# Patient Record
Sex: FEMALE | Race: WHITE | HISPANIC OR LATINO | Employment: STUDENT | URBAN - METROPOLITAN AREA
[De-identification: names, ages, dates, MRNs, and addresses within clinical notes are randomized per-mention and may not be internally consistent; named-entity substitution may affect disease eponyms.]

---

## 2017-10-13 ENCOUNTER — HOSPITAL ENCOUNTER (INPATIENT)
Facility: HOSPITAL | Age: 27
LOS: 24 days | Discharge: HOME | DRG: 750 | End: 2017-11-06
Attending: PSYCHIATRY & NEUROLOGY | Admitting: PSYCHIATRY & NEUROLOGY
Payer: COMMERCIAL

## 2017-10-13 ENCOUNTER — HOSPITAL ENCOUNTER (EMERGENCY)
Facility: HOSPITAL | Age: 27
End: 2017-10-13
Attending: EMERGENCY MEDICINE | Admitting: EMERGENCY MEDICINE
Payer: COMMERCIAL

## 2017-10-13 VITALS
TEMPERATURE: 98 F | OXYGEN SATURATION: 100 % | WEIGHT: 144 LBS | RESPIRATION RATE: 16 BRPM | HEART RATE: 84 BPM | DIASTOLIC BLOOD PRESSURE: 76 MMHG | SYSTOLIC BLOOD PRESSURE: 111 MMHG

## 2017-10-13 DIAGNOSIS — E03.9 HYPOTHYROID: ICD-10-CM

## 2017-10-13 DIAGNOSIS — F20.0 PARANOID SCHIZOPHRENIA (HCC): ICD-10-CM

## 2017-10-13 DIAGNOSIS — F25.1 SCHIZOAFFECTIVE DISORDER, DEPRESSIVE TYPE (HCC): Chronic | ICD-10-CM

## 2017-10-13 DIAGNOSIS — E63.9 NUTRITIONAL DEFICIENCY: ICD-10-CM

## 2017-10-13 DIAGNOSIS — F25.0 SCHIZOAFFECTIVE DISORDER, BIPOLAR TYPE (HCC): Primary | Chronic | ICD-10-CM

## 2017-10-13 DIAGNOSIS — E05.90 HYPERTHYROIDISM: ICD-10-CM

## 2017-10-13 DIAGNOSIS — F22 PARANOIA (HCC): Primary | ICD-10-CM

## 2017-10-13 LAB
AMPHETAMINES SERPL QL SCN: NEGATIVE
BARBITURATES UR QL: NEGATIVE
BENZODIAZ UR QL: NEGATIVE
COCAINE UR QL: NEGATIVE
ETHANOL EXG-MCNC: 0 MG/DL
EXT PREG TEST URINE: NEGATIVE
METHADONE UR QL: NEGATIVE
OPIATES UR QL SCN: NEGATIVE
PCP UR QL: NEGATIVE
THC UR QL: NEGATIVE

## 2017-10-13 PROCEDURE — 81025 URINE PREGNANCY TEST: CPT | Performed by: EMERGENCY MEDICINE

## 2017-10-13 PROCEDURE — 99285 EMERGENCY DEPT VISIT HI MDM: CPT

## 2017-10-13 PROCEDURE — 80307 DRUG TEST PRSMV CHEM ANLYZR: CPT | Performed by: EMERGENCY MEDICINE

## 2017-10-13 PROCEDURE — 82075 ASSAY OF BREATH ETHANOL: CPT | Performed by: EMERGENCY MEDICINE

## 2017-10-13 RX ORDER — LEVOTHYROXINE SODIUM 0.1 MG/1
100 TABLET ORAL
Status: DISCONTINUED | OUTPATIENT
Start: 2017-10-14 | End: 2017-10-27

## 2017-10-13 RX ORDER — MAGNESIUM HYDROXIDE/ALUMINUM HYDROXICE/SIMETHICONE 120; 1200; 1200 MG/30ML; MG/30ML; MG/30ML
30 SUSPENSION ORAL EVERY 4 HOURS PRN
Status: CANCELLED | OUTPATIENT
Start: 2017-10-13

## 2017-10-13 RX ORDER — BENZTROPINE MESYLATE 1 MG/ML
1 INJECTION INTRAMUSCULAR; INTRAVENOUS EVERY 6 HOURS PRN
Status: CANCELLED | OUTPATIENT
Start: 2017-10-13

## 2017-10-13 RX ORDER — ACETAMINOPHEN 325 MG/1
650 TABLET ORAL EVERY 6 HOURS PRN
Status: DISCONTINUED | OUTPATIENT
Start: 2017-10-13 | End: 2017-11-06 | Stop reason: HOSPADM

## 2017-10-13 RX ORDER — LEVOTHYROXINE SODIUM 0.1 MG/1
100 TABLET ORAL DAILY
Status: CANCELLED | OUTPATIENT
Start: 2017-10-14

## 2017-10-13 RX ORDER — HALOPERIDOL 5 MG
5 TABLET ORAL EVERY 6 HOURS PRN
Status: CANCELLED | OUTPATIENT
Start: 2017-10-13

## 2017-10-13 RX ORDER — ACETAMINOPHEN 325 MG/1
650 TABLET ORAL EVERY 6 HOURS PRN
Status: CANCELLED | OUTPATIENT
Start: 2017-10-13

## 2017-10-13 RX ORDER — IBUPROFEN 400 MG/1
800 TABLET ORAL EVERY 8 HOURS PRN
Status: DISCONTINUED | OUTPATIENT
Start: 2017-10-13 | End: 2017-11-06 | Stop reason: HOSPADM

## 2017-10-13 RX ORDER — LEVOTHYROXINE SODIUM 0.1 MG/1
100 TABLET ORAL ONCE
Status: COMPLETED | OUTPATIENT
Start: 2017-10-13 | End: 2017-10-13

## 2017-10-13 RX ORDER — OLANZAPINE 10 MG/1
10 INJECTION, POWDER, LYOPHILIZED, FOR SOLUTION INTRAMUSCULAR
Status: DISCONTINUED | OUTPATIENT
Start: 2017-10-13 | End: 2017-11-06 | Stop reason: HOSPADM

## 2017-10-13 RX ORDER — HALOPERIDOL 5 MG/ML
5 INJECTION INTRAMUSCULAR EVERY 6 HOURS PRN
Status: DISCONTINUED | OUTPATIENT
Start: 2017-10-13 | End: 2017-11-06 | Stop reason: HOSPADM

## 2017-10-13 RX ORDER — IBUPROFEN 400 MG/1
800 TABLET ORAL EVERY 8 HOURS PRN
Status: CANCELLED | OUTPATIENT
Start: 2017-10-13

## 2017-10-13 RX ORDER — HYDROXYZINE HYDROCHLORIDE 25 MG/1
25 TABLET, FILM COATED ORAL EVERY 6 HOURS PRN
Status: CANCELLED | OUTPATIENT
Start: 2017-10-13

## 2017-10-13 RX ORDER — TRAZODONE HYDROCHLORIDE 50 MG/1
50 TABLET ORAL
Status: DISCONTINUED | OUTPATIENT
Start: 2017-10-13 | End: 2017-11-06 | Stop reason: HOSPADM

## 2017-10-13 RX ORDER — BENZTROPINE MESYLATE 1 MG/ML
1 INJECTION INTRAMUSCULAR; INTRAVENOUS EVERY 6 HOURS PRN
Status: DISCONTINUED | OUTPATIENT
Start: 2017-10-13 | End: 2017-11-06 | Stop reason: HOSPADM

## 2017-10-13 RX ORDER — LORAZEPAM 2 MG/ML
1 INJECTION INTRAMUSCULAR EVERY 4 HOURS PRN
Status: CANCELLED | OUTPATIENT
Start: 2017-10-13

## 2017-10-13 RX ORDER — LORAZEPAM 2 MG/ML
1 INJECTION INTRAMUSCULAR EVERY 4 HOURS PRN
Status: DISCONTINUED | OUTPATIENT
Start: 2017-10-13 | End: 2017-11-06 | Stop reason: HOSPADM

## 2017-10-13 RX ORDER — HALOPERIDOL 5 MG/ML
5 INJECTION INTRAMUSCULAR EVERY 6 HOURS PRN
Status: CANCELLED | OUTPATIENT
Start: 2017-10-13

## 2017-10-13 RX ORDER — HYDROXYZINE HYDROCHLORIDE 25 MG/1
25 TABLET, FILM COATED ORAL EVERY 6 HOURS PRN
Status: DISCONTINUED | OUTPATIENT
Start: 2017-10-13 | End: 2017-11-06 | Stop reason: HOSPADM

## 2017-10-13 RX ORDER — RISPERIDONE 1 MG/1
1 TABLET, ORALLY DISINTEGRATING ORAL
Status: CANCELLED | OUTPATIENT
Start: 2017-10-13

## 2017-10-13 RX ORDER — LORAZEPAM 1 MG/1
1 TABLET ORAL EVERY 6 HOURS PRN
Status: CANCELLED | OUTPATIENT
Start: 2017-10-13

## 2017-10-13 RX ORDER — TRAZODONE HYDROCHLORIDE 50 MG/1
50 TABLET ORAL
Status: CANCELLED | OUTPATIENT
Start: 2017-10-13

## 2017-10-13 RX ORDER — LORAZEPAM 1 MG/1
1 TABLET ORAL EVERY 6 HOURS PRN
Status: DISCONTINUED | OUTPATIENT
Start: 2017-10-13 | End: 2017-11-06 | Stop reason: HOSPADM

## 2017-10-13 RX ORDER — OLANZAPINE 10 MG/1
10 INJECTION, POWDER, LYOPHILIZED, FOR SOLUTION INTRAMUSCULAR
Status: CANCELLED | OUTPATIENT
Start: 2017-10-13

## 2017-10-13 RX ORDER — ACETAMINOPHEN 325 MG/1
650 TABLET ORAL EVERY 4 HOURS PRN
Status: DISCONTINUED | OUTPATIENT
Start: 2017-10-13 | End: 2017-11-06 | Stop reason: HOSPADM

## 2017-10-13 RX ORDER — ACETAMINOPHEN 325 MG/1
650 TABLET ORAL EVERY 4 HOURS PRN
Status: CANCELLED | OUTPATIENT
Start: 2017-10-13

## 2017-10-13 RX ORDER — RISPERIDONE 1 MG/1
1 TABLET, ORALLY DISINTEGRATING ORAL
Status: DISCONTINUED | OUTPATIENT
Start: 2017-10-13 | End: 2017-11-06 | Stop reason: HOSPADM

## 2017-10-13 RX ORDER — MAGNESIUM HYDROXIDE/ALUMINUM HYDROXICE/SIMETHICONE 120; 1200; 1200 MG/30ML; MG/30ML; MG/30ML
30 SUSPENSION ORAL EVERY 4 HOURS PRN
Status: DISCONTINUED | OUTPATIENT
Start: 2017-10-13 | End: 2017-11-06 | Stop reason: HOSPADM

## 2017-10-13 RX ORDER — HALOPERIDOL 5 MG
5 TABLET ORAL EVERY 6 HOURS PRN
Status: DISCONTINUED | OUTPATIENT
Start: 2017-10-13 | End: 2017-11-06 | Stop reason: HOSPADM

## 2017-10-13 RX ORDER — BENZTROPINE MESYLATE 0.5 MG/1
1 TABLET ORAL EVERY 6 HOURS PRN
Status: CANCELLED | OUTPATIENT
Start: 2017-10-13

## 2017-10-13 RX ORDER — BENZTROPINE MESYLATE 1 MG/1
1 TABLET ORAL EVERY 6 HOURS PRN
Status: DISCONTINUED | OUTPATIENT
Start: 2017-10-13 | End: 2017-11-06 | Stop reason: HOSPADM

## 2017-10-13 RX ADMIN — LEVOTHYROXINE SODIUM 100 MCG: 100 TABLET ORAL at 08:35

## 2017-10-13 NOTE — ED NOTES
[facility] arrived for transport  Given all of patient's belongings and paperwork  Patient remains cooperative at time of transport       Betty Baig RN  10/13/17 0383
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
## 2017-10-13 NOTE — ED NOTES
Report called to Ankit at [facility]  Noted in report that patient's thyroid medication will be in her backpack       Brian Garza RN  10/13/17 Britton Moya RN  10/13/17 7030

## 2017-10-13 NOTE — ED NOTES
Assumed care of pt at this time; pt sleeping in bed; respirations relaxed and unlabored       Andi Ro RN  10/13/17 3608

## 2017-10-13 NOTE — H&P
Chief Complaint: weight gain    HPI: Therese Jansen is a 32 y o  female who presents to ER with family complaining of pt's bizarre behavior and threats to kill her father and paranoia and hallucinations  Pt is primarily concerned with losing weight  Pt admitted for psychiatric evaluation and treatment  Language line was used to interpret, pt mostly [place] Speaking  Review of Systems:  General: negative  Cardiovascular: no chest pain or dyspnea on exertion  Respiratory: no cough, shortness of breath, or wheezing  Gastrointestinal: no abdominal pain, change in bowel habits, or black or bloody stools  Genitourinary ROS: no dysuria, trouble voiding, or hematuria  Musculoskeletal ROS: negative  Neurological ROS: no TIA or stroke symptoms  Hematological and Lymphatic ROS: negative  Dermatological ROS: negative  Psychological ROS: positive for - behavioral disorder, hallucinations, obsessive thoughts and sleep disturbances  Ophthalmic ROS: negative  ENT ROS: positive for - sore throat and throat pain around thyroid area    Past Medical History:  Past Medical History:   Diagnosis Date    Disease of thyroid gland     Psychiatric illness     Schizoaffective disorder ([facility] [address] )     Schizophrenia ([facility] )        Past Surgical History:  Past Surgical History:   Procedure Laterality Date    ADENOIDECTOMY      TONSILLECTOMY         Social History:  History   Alcohol Use    Yes     Comment: Once a year on [employer]     History   Drug Use No     History   Smoking Status    Never Smoker   Smokeless Tobacco    Not on file       Family History:  Family History   Problem Relation Age of Onset    Thyroid disease Mother     Sleep apnea Father        Allergies:   Allergies   Allergen Reactions    Benadryl [Diphenhydramine]        Medications:  current meds:   Current Facility-Administered Medications   Medication Dose Route Frequency    acetaminophen (TYLENOL) tablet 650 mg  650 mg Oral Q6H PRN    acetaminophen (TYLENOL) tablet 650 mg 650 mg Oral Q4H PRN    aluminum-magnesium hydroxide-simethicone (MYLANTA) 200-200-20 mg/5 mL oral suspension 30 mL  30 mL Oral Q4H PRN    benztropine (COGENTIN) injection 1 mg  1 mg Intramuscular Q6H PRN    benztropine (COGENTIN) tablet 1 mg  1 mg Oral Q6H PRN    haloperidol (HALDOL) tablet 5 mg  5 mg Oral Q6H PRN    haloperidol lactate (HALDOL) injection 5 mg  5 mg Intramuscular Q6H PRN    hydrOXYzine HCL (ATARAX) tablet 25 mg  25 mg Oral Q6H PRN    ibuprofen (MOTRIN) tablet 800 mg  800 mg Oral Q8H PRN    [START ON 10/14/2017] levothyroxine tablet 100 mcg  100 mcg Oral Early Morning    LORazepam (ATIVAN) 2 mg/mL injection 1 mg  1 mg Intramuscular Q4H PRN    LORazepam (ATIVAN) tablet 1 mg  1 mg Oral Q6H PRN    magnesium hydroxide (MILK OF MAGNESIA) 400 mg/5 mL oral suspension 30 mL  30 mL Oral Daily PRN    OLANZapine (ZyPREXA) IM injection 10 mg  10 mg Intramuscular Q3H PRN    risperiDONE (RisperDAL M-TABS) dispersible tablet 1 mg  1 mg Oral Q3H PRN    traZODone (DESYREL) tablet 50 mg  50 mg Oral HS PRN       Vitals:  /82   Pulse 99   Temp 98 4 °F (36 9 °C) (Oral)   Resp 16   Ht 5' 3" (1 6 m)   Wt 65 3 kg (144 lb)   BMI 25 51 kg/m²     Lab Results and Cultures:   CBC with diff:   Lab Results   Component Value Date    WBC 10 65 (H) 02/21/2016    HGB 14 3 02/21/2016    HCT 41 9 02/21/2016    MCV 82 02/21/2016     02/21/2016    MCH 28 0 02/21/2016    MCHC 34 1 02/21/2016    RDW 11 7 02/21/2016    MPV 11 0 02/21/2016    NRBC 0 02/21/2016   ,   BMP/CMP:  Lab Results   Component Value Date     02/21/2016     08/10/2015    K 3 8 02/21/2016    K 4 1 08/10/2015     02/21/2016     08/10/2015    CO2 26 02/21/2016    CO2 29 08/10/2015    ANIONGAP 11 02/21/2016    ANIONGAP 5 08/10/2015    BUN 10 02/21/2016    BUN 14 08/10/2015    CREATININE 0 67 02/21/2016    CREATININE 0 67 08/10/2015    GLUCOSE 99 02/21/2016    GLUCOSE 93 08/10/2015    CALCIUM 9 5 02/21/2016    CALCIUM 8 5 08/10/2015    AST 11 01/21/2016    AST 16 08/10/2015    ALT 15 01/21/2016    ALT 30 08/10/2015    ALKPHOS 80 01/21/2016    ALKPHOS 79 08/10/2015    PROT 8 0 01/21/2016    PROT 7 6 08/10/2015    ALBUMIN 3 9 01/21/2016    ALBUMIN 3 8 08/10/2015    BILITOT 0 63 01/21/2016    BILITOT 0 50 08/10/2015    EGFR >60 0 02/21/2016   ,   Lipid Panel:   Lab Results   Component Value Date    CHOL 160 01/21/2016    CHOL 162 08/10/2015   ,   Coags: No results found for: PT, PTT, INR,     Blood Culture: No results found for: BLOODCX,   Urinalysis: No results found for: Sanders Inks, SPECGRAV, PHUR, LEUKOCYTESUR, NITRITE, PROTEINUA, GLUCOSEU, KETONESU, BILIRUBINUR, BLOODU,   Urine Culture: No results found for: URINECX,   Wound Culure: No results found for: WOUNDCULT      Physical Exam:    General appearance: alert, appears stated age and cooperative  Head: Normocephalic, without obvious abnormality, atraumatic  Eyes: conjunctivae/corneas clear  PERRL, EOM's intact  Fundi benign    Throat: lips, mucosa, and tongue normal; teeth and gums normal  Neck: no adenopathy, supple, symmetrical, trachea midline and thyroid tender, to masses felt  Lungs: clear to auscultation bilaterally  Heart: regular rate and rhythm, S1, S2 normal, no murmur, click, rub or gallop  Abdomen: soft, non-tender; bowel sounds normal; no masses,  no organomegaly  Genitalia: not applicable  Rectal: not applicable  Extremities: extremities normal, atraumatic, no cyanosis or edema  Skin: Skin color, texture, turgor normal  No rashes or lesions  Neurologic: Grossly normal, AxOx3, CN2-12 intact    Assessment:  33 yo female with bizarre behavior and known schizophrenia    Plan:  Admit for psychiatric evaluation and treatment  Internal medicine consult for throat pain/thyroid tenderness

## 2017-10-13 NOTE — ED NOTES
Patient ate all of her lunch  Mother and sister at bedside now  Patient will be transported to [facility] in approximately 30 minutes  Family is taking home the clothing from yesterday and patient's wallet  They did bring a backpack with bathroom supplies and clean clothing  Also brought patient's home thyroid medication       Heidy Chaney, RN  10/13/17 [id number], RN  10/13/17 2249

## 2017-10-13 NOTE — ED NOTES
Patient out of shower  Returned towels, toothbrush, toothpaste and soap       Rebecca Smith RN  10/13/17 5049
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
## 2017-10-13 NOTE — ED NOTES
Chart reviewed  Bed search in progress  Voicemail received from [place] requesting referral be faxed to [facility] - [phone]  Referral packet faxed
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
## 2017-10-13 NOTE — ED NOTES
Pt family states they would like to be informed with updates  Pts sister Jenelle Pitch to be called first at [phone]       Althea Keane RN  10/13/17 3433

## 2017-10-13 NOTE — ED NOTES
Patient provided with towel and new scrubs so that she may take a shower       Karson Crawford RN  10/13/17 2045
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
## 2017-10-14 LAB
ALBUMIN SERPL BCP-MCNC: 3.5 G/DL (ref 3.5–5)
ALP SERPL-CCNC: 68 U/L (ref 46–116)
ALT SERPL W P-5'-P-CCNC: 15 U/L (ref 12–78)
ANION GAP SERPL CALCULATED.3IONS-SCNC: 7 MMOL/L (ref 4–13)
AST SERPL W P-5'-P-CCNC: 8 U/L (ref 5–45)
BASOPHILS # BLD AUTO: 0.06 THOUSANDS/ΜL (ref 0–0.1)
BASOPHILS NFR BLD AUTO: 1 % (ref 0–1)
BILIRUB SERPL-MCNC: 0.4 MG/DL (ref 0.2–1)
BUN SERPL-MCNC: 12 MG/DL (ref 5–25)
CALCIUM SERPL-MCNC: 8.5 MG/DL (ref 8.3–10.1)
CHLORIDE SERPL-SCNC: 106 MMOL/L (ref 100–108)
CHOLEST SERPL-MCNC: 152 MG/DL (ref 50–200)
CO2 SERPL-SCNC: 25 MMOL/L (ref 21–32)
CREAT SERPL-MCNC: 0.66 MG/DL (ref 0.6–1.3)
EOSINOPHIL # BLD AUTO: 0.25 THOUSAND/ΜL (ref 0–0.61)
EOSINOPHIL NFR BLD AUTO: 5 % (ref 0–6)
ERYTHROCYTE [DISTWIDTH] IN BLOOD BY AUTOMATED COUNT: 12.1 % (ref 11.6–15.1)
GFR SERPL CREATININE-BSD FRML MDRD: 121 ML/MIN/1.73SQ M
GLUCOSE P FAST SERPL-MCNC: 89 MG/DL (ref 65–99)
GLUCOSE SERPL-MCNC: 89 MG/DL (ref 65–140)
HCG SERPL QL: NEGATIVE
HCT VFR BLD AUTO: 39.9 % (ref 34.8–46.1)
HDLC SERPL-MCNC: 47 MG/DL (ref 40–60)
HGB BLD-MCNC: 13.4 G/DL (ref 11.5–15.4)
LDLC SERPL CALC-MCNC: 93 MG/DL (ref 0–100)
LYMPHOCYTES # BLD AUTO: 1.74 THOUSANDS/ΜL (ref 0.6–4.47)
LYMPHOCYTES NFR BLD AUTO: 36 % (ref 14–44)
MCH RBC QN AUTO: 27.9 PG (ref 26.8–34.3)
MCHC RBC AUTO-ENTMCNC: 33.6 G/DL (ref 31.4–37.4)
MCV RBC AUTO: 83 FL (ref 82–98)
MONOCYTES # BLD AUTO: 0.41 THOUSAND/ΜL (ref 0.17–1.22)
MONOCYTES NFR BLD AUTO: 8 % (ref 4–12)
NEUTROPHILS # BLD AUTO: 2.42 THOUSANDS/ΜL (ref 1.85–7.62)
NEUTS SEG NFR BLD AUTO: 50 % (ref 43–75)
NRBC BLD AUTO-RTO: 0 /100 WBCS
PLATELET # BLD AUTO: 241 THOUSANDS/UL (ref 149–390)
PMV BLD AUTO: 11 FL (ref 8.9–12.7)
POTASSIUM SERPL-SCNC: 4.3 MMOL/L (ref 3.5–5.3)
PROT SERPL-MCNC: 7.5 G/DL (ref 6.4–8.2)
RBC # BLD AUTO: 4.8 MILLION/UL (ref 3.81–5.12)
SODIUM SERPL-SCNC: 138 MMOL/L (ref 136–145)
TRIGL SERPL-MCNC: 61 MG/DL
TSH SERPL DL<=0.05 MIU/L-ACNC: 2.78 UIU/ML (ref 0.36–3.74)
WBC # BLD AUTO: 4.89 THOUSAND/UL (ref 4.31–10.16)

## 2017-10-14 PROCEDURE — 80061 LIPID PANEL: CPT | Performed by: PSYCHIATRY & NEUROLOGY

## 2017-10-14 PROCEDURE — 86592 SYPHILIS TEST NON-TREP QUAL: CPT | Performed by: PSYCHIATRY & NEUROLOGY

## 2017-10-14 PROCEDURE — 84443 ASSAY THYROID STIM HORMONE: CPT | Performed by: PSYCHIATRY & NEUROLOGY

## 2017-10-14 PROCEDURE — 85025 COMPLETE CBC W/AUTO DIFF WBC: CPT | Performed by: PSYCHIATRY & NEUROLOGY

## 2017-10-14 PROCEDURE — 80053 COMPREHEN METABOLIC PANEL: CPT | Performed by: PSYCHIATRY & NEUROLOGY

## 2017-10-14 PROCEDURE — 84703 CHORIONIC GONADOTROPIN ASSAY: CPT | Performed by: PSYCHIATRY & NEUROLOGY

## 2017-10-14 RX ORDER — RISPERIDONE 1 MG/1
1 TABLET, FILM COATED ORAL
Status: DISCONTINUED | OUTPATIENT
Start: 2017-10-14 | End: 2017-10-16

## 2017-10-14 RX ADMIN — LEVOTHYROXINE SODIUM 100 MCG: 100 TABLET ORAL at 06:06

## 2017-10-14 RX ADMIN — HYDROXYZINE HYDROCHLORIDE 25 MG: 25 TABLET, FILM COATED ORAL at 18:51
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
## 2017-10-14 NOTE — PLAN OF CARE
Alteration in Thoughts and Perception     Treatment Goal: Gain control of psychotic behaviors/thinking, reduce/eliminate presenting symptoms and demonstrate improved reality functioning upon discharge Not Progressing        Patient has paranoid ideation possible delusions patient is reporting AH    Alteration in Thoughts and Perception     Verbalize thoughts and feelings Progressing     Refrain from acting on delusional thinking/internal stimuli Progressing     Agree to be compliant with medication regime, as prescribed and report medication side effects Progressing     Recognize dysfunctional thoughts, communicate reality-based thoughts at the time of discharge Progressing     Complete daily ADLs, including personal hygiene independently, as able [address] Discharge to home or other facility with appropriate resources Progressing        Ineffective Coping     Cooperates with admission process Progressing     Identifies ineffective coping skills Progressing     Identifies healthy coping skills Progressing     Participates in unit activities Progressing     Participates in unit activities Progressing     Patient/Family participate in treatment and DC plans Progressing        Risk for Violence/Aggression Toward Others     Treatment Goal: Refrain from acts of violence/aggression during length of stay, and demonstrate improved impulse control at the time of discharge Progressing     Refrain from destructive acts on the environment or property Progressing
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
## 2017-10-14 NOTE — PROGRESS NOTES
Family is supportive  Mom, dad and sister visited  Dad only wants pt to take meds if absolutely needed; he is fearful of side effects  Family asking how treatment will proceed  Explained that attending doctor returns on Monday and pt will be assessed and discussed in treatment team  Explained process for discharge as they were asking if pt could go home and check in daily for treatment  Explained once stable pt will be discharged with appointments for psychiatrist/therapist  Family/pt agreeable to plan

## 2017-10-14 NOTE — CONSULTS
Inpatient Medical Consultation - [facility]    Patient Information: Cheryl Kate 32 y o  female MRN: [id number]  Unit/Bed#: JU9 765-01 Encounter: [id number]  PCP: Derik Lloyd MD  Date of Admission:  10/13/2017  Date of Consultation: 10/14/17  Requesting Physician: Rosa Mann MD    Reason For Consultation:   "Thyroid tenderness"    Assessment/Plan:  · Sore throat  · Patient no longer complaining of pain  · Examination is benign  · Continue to monitor  SLIM will sign off  · TSH is normal     Recommendations for Discharge:  · Per psychiatry    Counseling / Coordination of Care Time: 20 minutes  Greater than 50% of total time spent on patient counseling and coordination of care  Collaboration of Care: Were Recommendations Directly Discussed with Primary Treatment Team? - Yes     History of Present Illness:    Cheryl Kate is a 32 y o  female who is originally admitted to the psychiatric service on 10/13/2017 due to bizarre behavior  We are consulted for "thyroid tenderness"  According to the patient, she had some pain yesterday in her anterior neck but this has since resolved without recurrence  She denies any odynophagia  Review of Systems:    Review of Systems   Constitutional: Negative for appetite change, chills, diaphoresis, fatigue, fever and unexpected weight change  HENT: Negative for congestion and sore throat  Eyes: Negative for visual disturbance  Respiratory: Negative for cough, chest tightness, shortness of breath and wheezing  Cardiovascular: Negative for chest pain, palpitations and leg swelling  Gastrointestinal: Negative for abdominal distention, abdominal pain, blood in stool, constipation, diarrhea, nausea and vomiting  Genitourinary: Negative for difficulty urinating, dysuria, frequency, hematuria and urgency  Musculoskeletal: Negative for back pain, gait problem and neck pain  Skin: Negative for rash     Neurological: Negative for dizziness, tremors, speech difficulty, weakness, light-headedness, numbness and headaches  Psychiatric/Behavioral: Positive for behavioral problems  Negative for confusion  The patient is not nervous/anxious  All other systems reviewed and are negative  Past Medical and Surgical History:     Past Medical History:   Diagnosis Date    Disease of thyroid gland     Psychiatric illness     [facility]oaffect[facility] disorder ([facility] [address] )     [facility]izophrenia ([facility] )        Past Surgical History:   Procedure Laterality Date    ADENOIDECTOMY      TONSILLECTOMY         Meds/Allergies:    PTA meds:   Prior to Admission Medications   Prescriptions Last Dose Informant Patient Reported? Taking?   levothyroxine 100 mcg tablet   Yes No   Sig: Take 112 mcg by mouth daily        Facility-Administered Medications: None       Allergies: Allergies   Allergen Reactions    Benadryl [Diphenhydramine]        Social History:     Marital Status: Single    Substance Use History:   History   Alcohol Use    Yes     Comment: Once a year on [place]     History   Smoking Status    Never Smoker   Smokeless Tobacco    Not on file     History   Drug Use No       Family History:    Family History   Problem Relation Age of Onset    Thyroid disease Mother     Sleep apnea Father        Physical Exam:     Vitals:   Blood Pressure: 110/76 (10/14/17 1554)  Pulse: 76 (10/14/17 1554)  Temperature: 97 9 °F (36 6 °C) (10/14/17 1554)  Temp Source: Oral (10/14/17 1554)  Respirations: 16 (10/14/17 1554)  Height: 5' 3" (160 cm) (10/13/17 1425)  Weight - Scale: 65 3 kg (144 lb) (10/13/17 1425)    Physical Exam   Constitutional: She is oriented to person, place, and time  She appears well-developed and well-nourished  HENT:   Head: Normocephalic and atraumatic  Mouth/Throat: Uvula is midline, oropharynx is clear and moist and mucous membranes are normal  No oral lesions  No uvula swelling  No oropharyngeal exudate or tonsillar abscesses  Eyes: No scleral icterus  Neck: Neck supple  No tracheal tenderness present  No tracheal deviation present  No thyroid mass and no thyromegaly present  Cardiovascular: Normal rate and regular rhythm  No murmur heard  Pulmonary/Chest: Effort normal and breath sounds normal  No respiratory distress  She has no wheezes  She has no rales  She exhibits no tenderness  Abdominal: Soft  Bowel sounds are normal  She exhibits no distension  There is no tenderness  Musculoskeletal: Normal range of motion  She exhibits no edema  Neurological: She is alert and oriented to person, place, and time  Skin: Skin is warm and dry  No rash noted  Psychiatric: She has a normal mood and affect  Vitals reviewed  Additional Data:     Lab Results: I have personally reviewed pertinent reports  Results from last 7 days  Lab Units 10/14/17  0632   WBC Thousand/uL 4 89   HEMOGLOBIN g/dL 13 4   HEMATOCRIT % 39 9   PLATELETS Thousands/uL 241   NEUTROS PCT % 50   LYMPHS PCT % 36   MONOS PCT % 8   EOS PCT % 5       Results from last 7 days  Lab Units 10/14/17  0633   SODIUM mmol/L 138   POTASSIUM mmol/L 4 3   CHLORIDE mmol/L 106   CO2 mmol/L 25   BUN mg/dL 12   CREATININE mg/dL 0 66   CALCIUM mg/dL 8 5   TOTAL PROTEIN g/dL 7 5   BILIRUBIN TOTAL mg/dL 0 40   ALK PHOS U/L 68   ALT U/L 15   AST U/L 8   GLUCOSE RANDOM mg/dL 89           Imaging: I have personally reviewed pertinent reports  No results found  EKG, Pathology, and Other Studies Reviewed on Admission:   · EKG: None    ** Please Note: This note has been constructed using a voice recognition system   **

## 2017-10-14 NOTE — PROGRESS NOTES
Pt mostly seclusive to room reading bible  Pt speaks some english and was able to communicate with this writer  She denies SI/HI/AVH at this time  She states she did have voices prior to admission  She is bright and smiling on interaction but appears anxious  When asked if anxious she replied yes and accepted PRN atarax and was given education  Family into visit  Will monitor

## 2017-10-14 NOTE — PROGRESS NOTES
Patient signed ZHOU for mother Forrestine Mace) and father Forrestine Mace) and sister Forrestine Mace)

## 2017-10-15 RX ADMIN — LEVOTHYROXINE SODIUM 100 MCG: 100 TABLET ORAL at 06:16

## 2017-10-15 NOTE — PROGRESS NOTES
RN spoke with patient via  phone,  number [phone]  Patient states she feels, "very good"  Denies all symptoms other then mild anxiety  Patient has been social with Greek speaking peer  Pt is currently in milieu watching TV

## 2017-10-15 NOTE — TREATMENT PLAN
TREATMENT PLAN REVIEW - [address] 32 y o  1990 female MRN: [id number]    [address] [facility] Room / Bed: [place] [address] 765-01 Encounter: [id number]          Admit Date/Time:  10/13/2017  1:44 PM    Treatment Team: Attending Provider: Fara Collier MD; Patient Care Technician: Palmira Page; Patient Care Technician: Ritu Polo; Patient Care Technician: Sandra Benavidez; Registered Nurse: Jim Kraus RN; Nursing Student: Chinedu Toth    Diagnosis: Psychosis NOS    Patient Strengths/Assets: ability for insight, cooperative, motivated    Patient Barriers/Limitations: difficulty adapting, noncompliant with medication    Short Term Goals: decrease in paranoid thoughts, decrease in psychotic symptoms, improvement in ability to express basic needs, improvement in reality testing, improvement in reasoning ability    Long Term Goals: improvement in anxiety, resolution of psychotic symptoms, improvement in reasoning ability, appropriate interaction with peers    Progress Towards Goals: starting psychiatric medications as prescribed, improving gradually, insight is improving, reality testing is improving    Recommended Treatment: medication management, patient medication education, group therapy, milieu therapy, continued Behavioral Health psychiatric evaluation/assessment process    Treatment Frequency: daily medication monitoring, group and milieu therapy daily, monitoring through interdisciplinary rounds, monitoring through weekly patient care conferences    Expected Discharge Date:   In 2 wks    Discharge Plan: placement in group home, return to previous living arrangement    Treatment Plan Created/Updated By: Marina Fulton MD

## 2017-10-15 NOTE — PLAN OF CARE
Alteration in Thoughts and Perception     Treatment Goal: Gain control of psychotic behaviors/thinking, reduce/eliminate presenting symptoms and demonstrate improved reality functioning upon discharge Progressing     Verbalize thoughts and feelings Progressing     Refrain from acting on delusional thinking/internal stimuli Progressing     Agree to be compliant with medication regime, as prescribed and report medication side effects Progressing     Recognize dysfunctional thoughts, communicate reality-based thoughts at the time of discharge Progressing     Complete daily ADLs, including personal hygiene independently, as able [address] Discharge to home or other facility with appropriate resources Progressing        Ineffective Coping     Cooperates with admission process Progressing     Identifies ineffective coping skills Progressing     Identifies healthy coping skills Progressing     Participates in unit activities Progressing     Participates in unit activities Progressing     Patient/Family participate in treatment and DC plans Progressing        Risk for Violence/Aggression Toward Others     Treatment Goal: Refrain from acts of violence/aggression during length of stay, and demonstrate improved impulse control at the time of discharge Progressing     Refrain from destructive acts on the environment or property Progressing

## 2017-10-15 NOTE — PROGRESS NOTES
C/O" I am doing Ok, you told me that I need to take medication every day, nurse told me No "    Report from staff regarding this patient received and record reviewed  prior to seeing this patient   Behavior over the last 24 hours: had confusion about meds which was clearified   Sleep:7 hrs  Appetite:ok  Medication side effects:none  ROS:imporving  Mental Status Evaluation:  Appearance:  Dressed appropraitely   Behavior:  cooperative   Speech:  normal   Mood:  euthymic   Affect:  appropriate     Thought Process:  Goal directed   Thought Content:  normal   Perceptual Disturbances: Denied AV hallucination   Risk Potential: NO PAWEL    Sensorium:  normal   Cognition:  intact   Consciousness:  Alert, OX3   Attention: Fair   Insight:  fair   Judgment: fair   Gait/Station: With in normal range   Motor Activity: With in normal range     Progress Toward Goals: working on current treatment goals, no changes  Made in treatment plan   Recommended Treatment: Continue with group therapy, milieu therapy and occupational therapy  Risks, benefits and possible side effects of Medications:   Risks, benefits, and possible side effects of medications explained to patient and patient verbalizes understanding        Medications:   current meds:   Current Facility-Administered Medications   Medication Dose Route Frequency    acetaminophen (TYLENOL) tablet 650 mg  650 mg Oral Q6H PRN    acetaminophen (TYLENOL) tablet 650 mg  650 mg Oral Q4H PRN    aluminum-magnesium hydroxide-simethicone (MYLANTA) 200-200-20 mg/5 mL oral suspension 30 mL  30 mL Oral Q4H PRN    benztropine (COGENTIN) injection 1 mg  1 mg Intramuscular Q6H PRN    benztropine (COGENTIN) tablet 1 mg  1 mg Oral Q6H PRN    haloperidol (HALDOL) tablet 5 mg  5 mg Oral Q6H PRN    haloperidol lactate (HALDOL) injection 5 mg  5 mg Intramuscular Q6H PRN    hydrOXYzine HCL (ATARAX) tablet 25 mg  25 mg Oral Q6H PRN    ibuprofen (MOTRIN) tablet 800 mg  800 mg Oral Q8H PRN  levothyroxine tablet 100 mcg  100 mcg Oral Early Morning    LORazepam (ATIVAN) 2 mg/mL injection 1 mg  1 mg Intramuscular Q4H PRN    LORazepam (ATIVAN) tablet 1 mg  1 mg Oral Q6H PRN    magnesium hydroxide (MILK OF MAGNESIA) 400 mg/5 mL oral suspension 30 mL  30 mL Oral Daily PRN    OLANZapine (ZyPREXA) IM injection 10 mg  10 mg Intramuscular Q3H PRN    risperiDONE (RisperDAL M-TABS) dispersible tablet 1 mg  1 mg Oral Q3H PRN    risperiDONE (RisperDAL) tablet 1 mg  1 mg Oral HS    traZODone (DESYREL) tablet 50 mg  50 mg Oral HS PRN     Labs: NA    Assessment, Diagnosis  and Plan: continue with current meds and goals, F/U tomorrow    Counseling / Coordination of Care  Total floor / unit time spent today15 minutes  minutes  Greater than 50% of total time was spent with the patient and / or family counseling and / or coordination of care  A description of the counseling / coordination of care:      Soni Milian MD

## 2017-10-16 PROBLEM — F41.1 GAD (GENERALIZED ANXIETY DISORDER): Chronic | Status: ACTIVE | Noted: 2017-10-16

## 2017-10-16 PROBLEM — F25.1 SCHIZOAFFECTIVE DISORDER, DEPRESSIVE TYPE (HCC): Chronic | Status: ACTIVE | Noted: 2017-10-13

## 2017-10-16 PROBLEM — J30.9 ATOPIC RHINITIS: Status: ACTIVE | Noted: 2017-10-16

## 2017-10-16 PROBLEM — E03.9 HYPOTHYROIDISM: Status: ACTIVE | Noted: 2017-10-16

## 2017-10-16 LAB — RPR SER QL: NORMAL

## 2017-10-16 RX ADMIN — LEVOTHYROXINE SODIUM 100 MCG: 100 TABLET ORAL at 06:19

## 2017-10-16 RX ADMIN — LURASIDONE HYDROCHLORIDE 20 MG: 20 TABLET, FILM COATED ORAL at 18:23

## 2017-10-16 NOTE — PROGRESS NOTES
Pt visible on unit  Social with Sinhala speaking peers  Mom and sister into visit  Pt accepted Vinod Gay without incident and asked appropriate questions  Pt c/o "gas pain" and was offered Maalox and declined  Pt denies AVH but continues with inappropriate smile  Denies SI/HI  Compliant with care  Will monitor

## 2017-10-16 NOTE — DISCHARGE INSTR - OTHER ORDERS
[facility] Crisis Phone Number : [phone]  The [facility] Family-to-Family Education Program is a free 12-week (2 1/2 hours/week) course for families of individuals with severe brain disorders (mental illnesses)  The classes are taught by trained family members  All course materials are furnished at no cost to you  Below are some details  To register, e-mail [email or web address] or call [phone]  The curriculum focuses on schizophrenia, bipolar disorder (manic depression), clinical depression, panic disorders and obsessive-compulsive disorder (OCD)  The course discusses the clinical treatment of these illnesses and teaches the knowledge and skills that family members need to cope more effectively  Topics Include:  Learning about feelings, learning about facts   Schizophrenia, major depression and arun: diagnosis and dealing with critical periods   Subtypes of depression and bipolar disorder, panic disorder and OCD; diagnosis and causes; sharing our stories   The biology of the brain/new research   Problem solving workshops   Medication review   Empathy workshop  what its like to have a brain disorder   Communication skills workshop   Self-care and relative groups   Rehabilitation, services available   Advocacy: fighting stigma   Review and certification ceremony    [facility] Education Course  The [employer] Education class is a ten week  two hours per week  experiential education course on the topic of recovery for any person with serious mental illness who is interested in establishing and maintaining wellness  The course uses a combination of lecture, interactive exercises, and structural group processes  The diversity of experience among participants affords for a lively dynamic that moves the course along  [facility] Education class is offered free of charge to people who experience mental illness  You do not need to be a member of [facility] to take the course   Courses are taught by teams of trained mentors/peer-teachers who are themselves experienced at living well with mental illness  Below are some details  To register, call [phone] or e-mail [email or web address]  Sign up today! [address] group is for family members, caregivers, and loved ones of individuals living with the everyday challenges of mental illness  The leaders are family members in the same situation  Sessions take place in an intimate, confidential setting to allow families to share openly with each other  These support groups allow participants to learn from the experiences of other group members, share coping strategies, and offer each other encouragement and understanding  Cristian Fuchs know that you are not alone  Drop inno registration is necessary  Here are the times and locations  [facility]  Monthly: 3rd Monday, 7:00-8:30 pm  [address], [place]  Monthly: 4th Tuesday, 7:00-8:30 pm  [address] [facility]  Monthly: 1st Monday, 7:00-8:30 pm  [address]         Monthly Support for Persons with Mental Illness  The Peer Support Group is a monthly meeting for individuals facing the challenges of recovering from severe and persistent mental illness  Depression, manic depression, schizophrenia, and general anxiety disorder are only a few of the diagnoses of individuals who have found a supportive place at our meetings  Our [place]  We are a fellowship of individuals who share a common goal of recovery and the ability to maintain mental and emotional stability  We help others and ourselves through sharing our experiences, strength and hope with each other  No matter how traumatic our past or how despairing our present may be, there is hope for a new day    Sessions take place in an intimate, confidential setting to allow individuals to share openly with each other  Patricia Bello know that you are not alone  Drop inno registration is necessary  Here are the times and locations    [place]  Monthly: 1st Monday, 7:00-8:30 pm  [facility]  [address] [place], [place]   [id number]  Monthly: 3rd Monday, 7:00-8:30 pm  [address]  [address] [facility]
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
## 2017-10-16 NOTE — PROGRESS NOTES
Pt visible on unit  Social with Mosotho speaking female peer on unit  Denies AVH but appears to be responding to IS, inappropriate smile, wide eyed  Pt reluctant to take schedule Risperdal without family being notified  Pt on phone speaking with family at this time  Pt stated she was diagnosed with schizophrenia 2 years ago but does not take meds and is not sure why  Pt does not remember being violent and destroying property at home  Pt completed call and states she will not take the medication before the doctor discusses the medication with her father  Pt was brought belongings from home by family  In the belongings there was a clove of garlic wrapped in a receipt  Pt was given belongings and MHT did not realize the garlic was in the wadded up receipt and pt got upset stating she needed the garlic  Told Mosotho speaking RN the garlic is from her sister and it "cures all illnesses  "

## 2017-10-16 NOTE — PROGRESS NOTES
Progress Note - [id number] Braden 32 y o  female MRN: [id number]   Unit/Bed#: TA4 765-01 Encounter: [id number]    Interviewed with Language Line,  # [id number]    Behavior over the last 24 hours: unchanged  Colleen Kerns feels anxious and depressed, has poor eye contact and tangential thinking process  She seems very paranoid, talked about her father not being her father  She seemed distracted and appeared responding to internal stimuli  Limited insight; has been refusing Risperdal  Limited participation in milieu  Sleep: slept off and on  Appetite: normal  Medication side effects: No   ROS: no complaints, denies any headache, chest pain or abdominal pain    Mental Status Evaluation:    Appearance:  casually dressed   Behavior:  cooperative, poor eye contact   Speech:  soft, tangential   Mood:  dysphoric, anxious   Affect:  blunted   Thought Process:  disorganized, illogical   Associations: tangential associations   Thought Content:  persecutory delusions   Perceptual Disturbances: vague auditory hallucinations, no visual hallucinations, appears responding to internal stimuli   Risk Potential: Suicidal ideation - None  Homicidal ideation - None  Potential for aggression - No   Sensorium:  oriented to person, place and time/date   Memory:  recent and remote memory grossly intact   Consciousness:  alert and awake   Attention: poor concentration and poor attention span   Insight:  impaired   Judgment: impaired   Gait/Station: normal gait/station and normal balance   Motor Activity: no abnormal movements     Vital signs in last 24 hours:    Temp:  [97 9 °F (36 6 °C)] 97 9 °F (36 6 °C)  HR:  [70-86] 86  Resp:  [16] 16  BP: (116-141)/(64-74) 141/64    Laboratory results:  I have personally reviewed all pertinent laboratory/tests results      Most Recent Labs:   Lab Results   Component Value Date    WBC 4 89 10/14/2017    RBC 4 80 10/14/2017    HGB 13 4 10/14/2017    HCT 39 9 10/14/2017     10/14/2017    RDW 12 1 10/14/2017    NEUTROABS 2 42 10/14/2017     10/14/2017    K 4 3 10/14/2017     10/14/2017    CO2 25 10/14/2017    BUN 12 10/14/2017    CREATININE 0 66 10/14/2017    GLUCOSE 89 10/14/2017    CALCIUM 8 5 10/14/2017    AST 8 10/14/2017    ALT 15 10/14/2017    ALKPHOS 68 10/14/2017    PROT 7 5 10/14/2017    ALBUMIN 3 5 10/14/2017    BILITOT 0 40 10/14/2017    CHOL 152 10/14/2017    HDL 47 10/14/2017    TRIG 61 10/14/2017    LDLCALC 93 10/14/2017    QJG4WWWYBVFU 2 780 10/14/2017    PREGSERUM Negative 10/14/2017   Drug Screen:   Lab Results   Component Value Date    BARBTUR Negative 10/13/2017    BDZUR Negative 10/13/2017    THCUR Negative 10/13/2017    COCAINEUR Negative 10/13/2017    METHADONEUR Negative 10/13/2017    OPIATEUR Negative 10/13/2017    PCPUR Negative 10/13/2017       Progress Toward Goals: no significant improvement, still anxious, poor insight, poor reality testing, still has psychotic symptoms    Assessment/Plan   Principal Problem:    Schizoaffective disorder, depressive type ([address] )  Active Problems:    BIANCA (generalized anxiety disorder)    Recommended Treatment:     Planned medication and treatment changes: All current active medications have been reviewed  Encourage group therapy, milieu therapy and occupational therapy  Behavioral Health checks every 15 minutes  Discontinue Risperdal - refuses to take  Start Latuda 20 mg in the evening and titrate dose - she reluctantly agreed to try    Current medications:      levothyroxine 100 mcg Oral Early Morning   lurasidone 20 mg Oral Daily With Dinner       Risks / Benefits of Treatment:    Risks, benefits, and possible side effects of medications explained to patient including risk of parkinsonian symptoms, Tardive Dyskinesia and metabolic syndrome related to treatment with antipsychotic medications   Patient has limited understanding of risks and benefits treatment at this time, but agrees to take medications as prescribed  Risks of medications in pregnancy explained if female patient  Patient verbalizes understanding and agrees to notify her doctor if she becomes pregnant  Counseling / Coordination of Care: Total floor / unit time spent today 35 minutes  Greater than 50% of total time was spent with the patient and / or family counseling and / or coordination of care  A description of counseling / coordination of care:    Patient's progress discussed with staff in treatment team meeting  Medications, treatment progress and treatment plan reviewed with patient  Medication education provided to patient  Coping with family problems reviewed with patient  Coping skills reviewed with patient  Importance of medication and treatment compliance reviewed with patient  Reoriented to reality and reassured
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
## 2017-10-16 NOTE — TREATMENT PLAN
TREATMENT PLAN REVIEW - [address] 32 y o  1990 female MRN: [id number]    [address] [facility] Room / Bed: [place] [address] 765-01 Encounter: [id number]          Admit Date/Time:  10/13/2017  1:44 PM    Treatment Team: Attending Provider: Floresita Christianson MD; Care Coordinator: Mandi Weiner;  Occupational Therapy Assistant: LUIS Reed; Patient Care Technician: Pallavi Malagon; Patient Care Technician: David Brito; Registered Nurse: Desean Garcia RN; Patient Care Technician: Mana Alex; Patient Care Technician: Babar Suazo    Diagnosis: Principal Problem:    Schiz[facility]ffective disorder, depressive type ([facility] )  Active Problems:    BIANCA (generalized anxiety disorder)    Patient Strengths/Assets: patient is on a voluntary commitment, supportive family/friends     Patient Barriers/Limitations: limited insight, noncompliant with medication, noncompliant with treatment    Short Term Goals: decrease in psychotic symptoms, improvement in insight, improvement in reasoning ability, acceptance of psychiatric medications    Long Term Goals: resolution of psychotic symptoms, improved reality testing, improved insight, acceptance of need for psychiatric treatment    Progress Towards Goals: starting psychiatric medications as prescribed    Recommended Treatment: medication management, patient medication education, group therapy, milieu therapy, continued Behavioral Health psychiatric evaluation/assessment process     Treatment Frequency: daily medication monitoring, group and milieu therapy daily, monitoring through interdisciplinary rounds, monitoring through weekly patient care conferences    Expected Discharge Date:  7 days    Discharge Plan: referral for outpatient medication management with a psychiatrist, referral for outpatient psychotherapy, return to previous living arrangement    Treatment Plan Created/Updated By: Zayda Slaughter Laz Thompson MD
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
## 2017-10-16 NOTE — PLAN OF CARE
Problem: DISCHARGE PLANNING  Goal: Discharge to home or other facility with appropriate resources  INTERVENTIONS:  - Identify barriers to discharge w/patient and caregiver  - Arrange for needed discharge resources and transportation as appropriate  - Identify discharge learning needs (meds, wound care, etc )  - Arrange for interpretive services to assist at discharge as needed  - Refer to Case Management Department for coordinating discharge planning if the patient needs post-hospital services based on physician/advanced practitioner order or complex needs related to functional status, cognitive ability, or social support system   Outcome: Progressing      Problem: Ineffective Coping  Goal: Cooperates with admission process  Interventions:   - Complete admission process   Outcome: Completed Date Met: 10/16/17    Goal: Identifies ineffective coping skills  Outcome: Progressing    Goal: Identifies healthy coping skills  Outcome: Progressing    Goal: Participates in unit activities  Interventions:  - Provide therapeutic environment   - Provide required programming   - Redirect inappropriate behaviors    Outcome: Progressing    Goal: Participates in unit activities  Interventions:  - Provide therapeutic environment   - Provide required programming   - Redirect inappropriate behaviors    Outcome: Progressing    Goal: Patient/Family participate in treatment and DC plans  Interventions:  - Provide therapeutic environment   Outcome: Progressing      Problem: Alteration in Thoughts and Perception  Goal: Treatment Goal: Gain control of psychotic behaviors/thinking, reduce/eliminate presenting symptoms and demonstrate improved reality functioning upon discharge  Outcome: Progressing    Goal: Verbalize thoughts and feelings  Interventions:  - Promote a nonjudgmental and trusting relationship with the patient through active listening and therapeutic communication  - Assess patient's level of functioning, behavior and potential for risk  - Engage patient in 1 on 1 interactions for a minimum of 15 minutes each session  - Encourage patient to express fears, feelings, frustrations, and discuss symptoms    - [place] patient to reality, help patient recognize reality-based thinking   - Administer medications as ordered and assess for potential side effects  - Provide the patient education related to the signs and symptoms of the illness and desired effects of prescribed medications   Outcome: Progressing    Goal: Refrain from acting on delusional thinking/internal stimuli  Interventions:  - Monitor patient closely, per order   - Utilize least restrictive measures   - Set reasonable limits, give positive feedback for acceptable   - Administer medications as ordered and monitor of potential side effects   Outcome: Progressing    Goal: Agree to be compliant with medication regime, as prescribed and report medication side effects  Interventions:  - Offer appropriate PRN medication and supervise ingestion; conduct aims, as needed    Outcome: Progressing    Goal: Recognize dysfunctional thoughts, communicate reality-based thoughts at the time of discharge  Interventions:  - Provide medication and psycho-education to assist patient in compliance and developing insight into his/her illness    Outcome: Not Progressing    Goal: Complete daily ADLs, including personal hygiene independently, as able  Interventions:  - Observe, teach, and assist patient with ADLS  - Monitor and promote a balance of rest/activity, with adequate nutrition and elimination    Outcome: Progressing      Problem: Risk for Violence/Aggression Toward Others  Goal: Treatment Goal: Refrain from acts of violence/aggression during length of stay, and demonstrate improved impulse control at the time of discharge  Outcome: Progressing    Goal: Refrain from destructive acts on the environment or property  Outcome: Progressing

## 2017-10-17 RX ADMIN — LEVOTHYROXINE SODIUM 100 MCG: 100 TABLET ORAL at 06:36

## 2017-10-17 RX ADMIN — LURASIDONE HYDROCHLORIDE 40 MG: 40 TABLET, FILM COATED ORAL at 18:06

## 2017-10-17 NOTE — PROGRESS NOTES
Pt about unit  Social with Gabonese speaking peers  Appears paranoid/suspicious  +AH tell her to be skinny  Pt is pleasant and compliant with meds and care  Will monitor

## 2017-10-17 NOTE — PROGRESS NOTES
Progress Note - [id number] Lucy Braden 32 y o  female MRN: [id number]   Unit/Bed#: FN2 765-01 Encounter: [id number]    Interviewed with Language Line  # [id number]    Behavior over the last 24 hours: limited improvement  Negrito Steve is still paranoid and preoccupied, denies auditory hallucinations and visual hallucinations, but appears distracted and responding to internal stimuli  Denies feeling depressed otherwise, seems less anxious today  Affect is incongruent with inappropriate smile  She has been compliant now with medications -  took [place] last night  Had a good visit with mother and sister yesterday  Limited participation in milieu      Sleep: slept better  Appetite: normal  Medication side effects: No   ROS: no complaints, denies any headache, shortness of breath or chest pain    Mental Status Evaluation:    Appearance:  casually dressed   Behavior:  cooperative   Speech:  soft, tangential   Mood:  less anxious, less dysphoric   Affect:  inappropriate smile, mood-incongruent   Thought Process:  illogical, tangential   Associations: tangential associations   Thought Content:  paranoid ideation   Perceptual Disturbances: denies auditory or visual hallucinations when asked, but appears responding to internal stimuli   Risk Potential: Suicidal ideation - None  Homicidal ideation - None  Potential for aggression - No   Sensorium:  oriented to person, place and time/date   Memory:  recent and remote memory grossly intact   Consciousness:  alert and awake   Attention: poor concentration and poor attention span   Insight:  impaired   Judgment: impaired   Gait/Station: normal gait/station and normal balance   Motor Activity: no abnormal movements     Vital signs in last 24 hours:    Temp:  [98 °F (36 7 °C)-98 2 °F (36 8 °C)] 98 °F (36 7 °C)  HR:  [77-80] 77  Resp:  [16] 16  BP: (112-116)/(72-75) 116/72    Laboratory results:  I have personally reviewed all pertinent laboratory/tests results  RPR:   Lab Results   Component Value Date    RPR Non-Reactive 10/14/2017       Progress Toward Goals: slight progress, less anxious, still has psychotic symptoms    Assessment/Plan   Principal Problem:    Schizoaffective disorder, depressive type ([address] )  Active Problems:    BIANCA (generalized anxiety disorder)    Recommended Treatment:     Planned medication and treatment changes: All current active medications have been reviewed  Encourage group therapy, milieu therapy and occupational therapy  Behavioral Health checks every 15 minutes  Increase Latuda to 40 mg in the evening and titrate dose gradually    Continue all other medications:      levothyroxine 100 mcg Oral Early Morning   lurasidone 40 mg Oral Daily With Dinner       Risks / Benefits of Treatment:    Risks, benefits, and possible side effects of medications explained to patient  Patient has limited understanding of risks and benefits of treatment at this time, but agrees to take medications as prescribed  Risks of medications in pregnancy explained if female patient  Patient verbalizes understanding and agrees to notify her doctor if she becomes pregnant  Counseling / Coordination of Care:    Patient's progress discussed with staff in treatment team meeting  Medications, treatment progress and treatment plan reviewed with patient

## 2017-10-17 NOTE — CASE MANAGEMENT
CM called [facility] to schedule intake appointment   Intake is scheduled October 25th at 80 with Yi Speaking female therapist  (see AVS)

## 2017-10-17 NOTE — PLAN OF CARE
Problem: DISCHARGE PLANNING  Goal: Discharge to home or other facility with appropriate resources  INTERVENTIONS:  - Identify barriers to discharge w/patient and caregiver  - Arrange for needed discharge resources and transportation as appropriate  - Identify discharge learning needs (meds, wound care, etc )  - Arrange for interpretive services to assist at discharge as needed  - Refer to Case Management Department for coordinating discharge planning if the patient needs post-hospital services based on physician/advanced practitioner order or complex needs related to functional status, cognitive ability, or social support system   Outcome: Progressing      Problem: Ineffective Coping  Goal: Identifies ineffective coping skills  Outcome: Progressing    Goal: Identifies healthy coping skills  Outcome: Progressing    Goal: Participates in unit activities  Interventions:  - Provide therapeutic environment   - Provide required programming   - Redirect inappropriate behaviors    Outcome: Progressing    Goal: Participates in unit activities  Interventions:  - Provide therapeutic environment   - Provide required programming   - Redirect inappropriate behaviors    Outcome: Progressing    Goal: Patient/Family participate in treatment and DC plans  Interventions:  - Provide therapeutic environment   Outcome: Progressing      Problem: Alteration in Thoughts and Perception  Goal: Treatment Goal: Gain control of psychotic behaviors/thinking, reduce/eliminate presenting symptoms and demonstrate improved reality functioning upon discharge  Outcome: Progressing    Goal: Verbalize thoughts and feelings  Interventions:  - Promote a nonjudgmental and trusting relationship with the patient through active listening and therapeutic communication  - Assess patient's level of functioning, behavior and potential for risk  - Engage patient in 1 on 1 interactions for a minimum of 15 minutes each session  - Encourage patient to express fears, feelings, frustrations, and discuss symptoms    - [place] patient to reality, help patient recognize reality-based thinking   - Administer medications as ordered and assess for potential side effects  - Provide the patient education related to the signs and symptoms of the illness and desired effects of prescribed medications   Outcome: Progressing    Goal: Refrain from acting on delusional thinking/internal stimuli  Interventions:  - Monitor patient closely, per order   - Utilize least restrictive measures   - Set reasonable limits, give positive feedback for acceptable   - Administer medications as ordered and monitor of potential side effects   Outcome: Progressing    Goal: Agree to be compliant with medication regime, as prescribed and report medication side effects  Interventions:  - Offer appropriate PRN medication and supervise ingestion; conduct aims, as needed    Outcome: Progressing    Goal: Recognize dysfunctional thoughts, communicate reality-based thoughts at the time of discharge  Interventions:  - Provide medication and psycho-education to assist patient in compliance and developing insight into his/her illness    Outcome: Progressing    Goal: Complete daily ADLs, including personal hygiene independently, as able  Interventions:  - Observe, teach, and assist patient with ADLS  - Monitor and promote a balance of rest/activity, with adequate nutrition and elimination    Outcome: Progressing      Problem: Risk for Violence/Aggression Toward Others  Goal: Treatment Goal: Refrain from acts of violence/aggression during length of stay, and demonstrate improved impulse control at the time of discharge  Outcome: Progressing    Goal: Refrain from destructive acts on the environment or property  Outcome: Progressing

## 2017-10-17 NOTE — PROGRESS NOTES
Spoke with patient via language line, ID # [id number]  Patient remains paranoid  Also asked RN if the medication that she started on is going to help with the "schizophrenia" and "voices that she is hearing"  Patient also seems to be a bit preoccupied with her weight and said that she gets depressed looking in the mirror at herself  Asked RN for information regarding weight loss and if the hospital can help her with weight loss while she is here  Patient stated to Rn that she will continue with the medications after discharge if it is necessary  Will continue to monitor

## 2017-10-18 RX ORDER — SIMETHICONE 80 MG
80 TABLET,CHEWABLE ORAL EVERY 6 HOURS PRN
Status: DISCONTINUED | OUTPATIENT
Start: 2017-10-18 | End: 2017-11-06 | Stop reason: HOSPADM

## 2017-10-18 RX ADMIN — LEVOTHYROXINE SODIUM 100 MCG: 100 TABLET ORAL at 05:55

## 2017-10-18 RX ADMIN — IBUPROFEN 800 MG: 400 TABLET, FILM COATED ORAL at 11:58

## 2017-10-18 RX ADMIN — LURASIDONE HYDROCHLORIDE 40 MG: 40 TABLET, FILM COATED ORAL at 17:58

## 2017-10-18 RX ADMIN — SIMETHICONE CHEW TAB 80 MG 80 MG: 80 TABLET ORAL at 11:58

## 2017-10-18 NOTE — PROGRESS NOTES
Pt states the Emilee Flattery is making her nauseous and she threw up after taking it this evening  Encouraged to speak to her doctor about this side effect

## 2017-10-18 NOTE — PROGRESS NOTES
Spoke with patient via language line, ID # [id number]  Speech pressured, anxious  Patient remains preoccupied with her body image  Telling RN that at home she was doing stretches to make herself "taller" so she wouldn't be so fat  Asking about weight loss and if "anyone else has control over her losing weight"  Also speaking about her family in the [facility] and said she can't stand to listen to her parents talk about them anymore  No complaints of nausea or vomiting as of this time  Patient is c/o "gas"  This information will be passed on to Dr Pascale Greene

## 2017-10-18 NOTE — CASE MANAGEMENT
ANABELLA spoke with patients sister Tsering Contreras and updated  She said she will be in this evening to visit with Macario

## 2017-10-18 NOTE — PROGRESS NOTES
Pt visible on unit  Denies symptoms  Appears preoccupied  Denies n/v after taking Latuda with dinner  Family visiting  Will monitor

## 2017-10-18 NOTE — PROGRESS NOTES
Progress Note - [id number] Lucy Braden 32 y o  female MRN: [id number]   Unit/Bed#: BN0 765-01 Encounter: [id number]    Interviewed with Language Line,  # [id number]    Behavior over the last 24 hours: unchanged  Ngoc remains paranoid and preoccupied with weight loss and her body image  Feels anxious and distressed  Appears responding to internal stimuli and reports auditory hallucinations of "noises" and of "people talking"  Denies suicidal thoughts  Minimal participation in milieu  Compliant with medications  Sleep: slept off and on  Appetite: normal  Medication side effects: Yes - nausea and vomiting last night  ROS: reports headache, nausea and vomiting are resolved, denies any shortness of breath or chest pain    Mental Status Evaluation:    Appearance:  casually dressed   Behavior:  cooperative   Speech:  soft   Mood:  dysphoric, anxious   Affect:  blunted, mood-congruent   Thought Process:  illogical   Associations: tangential associations   Thought Content:  persecutory delusions   Perceptual Disturbances: auditory hallucinations of "noises" and of "people talking", no visual hallucinations, appears responding to internal stimuli   Risk Potential: Suicidal ideation - None  Homicidal ideation - None  Potential for aggression - No   Sensorium:  oriented to person, place and time/date   Memory:  recent and remote memory grossly intact   Consciousness:  alert and awake   Attention: decreased concentration and decreased attention span   Insight:  impaired   Judgment: impaired   Gait/Station: normal gait/station and normal balance   Motor Activity: no abnormal movements     Vital signs in last 24 hours:    Temp:  [97 9 °F (36 6 °C)-98 2 °F (36 8 °C)] 97 9 °F (36 6 °C)  HR:  [79] 79  Resp:  [15-16] 15  BP: (112-121)/(71-75) 112/75    Laboratory results:  I have personally reviewed all pertinent laboratory/tests results      Progress Toward Goals: no significant improvement, still anxious, still paranoid, insight remains poor, poor reality testing    Assessment/Plan   Principal Problem:    Schizoaffective disorder, depressive type ([facility] [address] )  Active Problems:    BIANCA (generalized anxiety disorder)    Recommended Treatment:     Planned medication and treatment changes: All current active medications have been reviewed  Encourage group therapy, milieu therapy and occupational therapy  Behavioral Health checks every 15 minutes  Continue Latuda at 40 mg in the evening today and reassess tomorrow if nausea returns  Ravinder Patel wants to wait till tomorrow before changing Latuda to another agent  Would consider Geodon tomorrow if nausea returns after Latuda dose this evening  Check EKG in case she needs switch from [place] to Geodon    Current medications:      levothyroxine 100 mcg Oral Early Morning   lurasidone 40 mg Oral Daily With Dinner       Risks / Benefits of Treatment:    Risks, benefits, and possible side effects of medications explained to patient  Patient has limited understanding of risks and benefits of treatment at this time, but agrees to take medications as prescribed  Risks of medications in pregnancy explained if female patient  Patient verbalizes understanding and agrees to notify her doctor if she becomes pregnant  Counseling / Coordination of Care: Total floor / unit time spent today 35 minutes  Greater than 50% of total time was spent with the patient and / or family counseling and / or coordination of care  A description of counseling / coordination of care:    Patient's progress discussed with staff in treatment team meeting  Medications, treatment progress and treatment plan reviewed with patient  Medication education provided to patient  Coping with ongoing anxiety and paranoid thoughts reviewed with patient  Coping strategies including reducing time spent worrying reviewed with patient  Reoriented to reality and reassured

## 2017-10-18 NOTE — PLAN OF CARE
Problem: DISCHARGE PLANNING  Goal: Discharge to home or other facility with appropriate resources  INTERVENTIONS:  - Identify barriers to discharge w/patient and caregiver  - Arrange for needed discharge resources and transportation as appropriate  - Identify discharge learning needs (meds, wound care, etc )  - Arrange for interpretive services to assist at discharge as needed  - Refer to Case Management Department for coordinating discharge planning if the patient needs post-hospital services based on physician/advanced practitioner order or complex needs related to functional status, cognitive ability, or social support system   Outcome: Progressing      Problem: Ineffective Coping  Goal: Identifies ineffective coping skills  Outcome: Progressing    Goal: Identifies healthy coping skills  Outcome: Progressing    Goal: Participates in unit activities  Interventions:  - Provide therapeutic environment   - Provide required programming   - Redirect inappropriate behaviors    Outcome: Not Progressing    Goal: Participates in unit activities  Interventions:  - Provide therapeutic environment   - Provide required programming   - Redirect inappropriate behaviors    Outcome: Not Progressing    Goal: Patient/Family participate in treatment and DC plans  Interventions:  - Provide therapeutic environment   Outcome: Progressing      Problem: Alteration in Thoughts and Perception  Goal: Treatment Goal: Gain control of psychotic behaviors/thinking, reduce/eliminate presenting symptoms and demonstrate improved reality functioning upon discharge  Outcome: Not Progressing    Goal: Verbalize thoughts and feelings  Interventions:  - Promote a nonjudgmental and trusting relationship with the patient through active listening and therapeutic communication  - Assess patient's level of functioning, behavior and potential for risk  - Engage patient in 1 on 1 interactions for a minimum of 15 minutes each session  - Encourage patient to express fears, feelings, frustrations, and discuss symptoms    - [place] patient to reality, help patient recognize reality-based thinking   - Administer medications as ordered and assess for potential side effects  - Provide the patient education related to the signs and symptoms of the illness and desired effects of prescribed medications   Outcome: Progressing    Goal: Refrain from acting on delusional thinking/internal stimuli  Interventions:  - Monitor patient closely, per order   - Utilize least restrictive measures   - Set reasonable limits, give positive feedback for acceptable   - Administer medications as ordered and monitor of potential side effects   Outcome: Not Progressing    Goal: Agree to be compliant with medication regime, as prescribed and report medication side effects  Interventions:  - Offer appropriate PRN medication and supervise ingestion; conduct aims, as needed    Outcome: Progressing    Goal: Recognize dysfunctional thoughts, communicate reality-based thoughts at the time of discharge  Interventions:  - Provide medication and psycho-education to assist patient in compliance and developing insight into his/her illness    Outcome: Not Progressing    Goal: Complete daily ADLs, including personal hygiene independently, as able  Interventions:  - Observe, teach, and assist patient with ADLS  - Monitor and promote a balance of rest/activity, with adequate nutrition and elimination    Outcome: Progressing      Problem: Risk for Violence/Aggression Toward Others  Goal: Treatment Goal: Refrain from acts of violence/aggression during length of stay, and demonstrate improved impulse control at the time of discharge  Outcome: Progressing    Goal: Refrain from destructive acts on the environment or property  Outcome: Progressing

## 2017-10-19 RX ADMIN — LEVOTHYROXINE SODIUM 100 MCG: 100 TABLET ORAL at 05:53

## 2017-10-19 RX ADMIN — LURASIDONE HYDROCHLORIDE 40 MG: 40 TABLET, FILM COATED ORAL at 18:08

## 2017-10-19 NOTE — CASE MANAGEMENT
ANABELLA met with Ngoc to discuss discharge planning and review the treatment plan  Pt agreeable with the treatment plan and did sign  CM reviewed the outpatient intake appointment at [facility] and she was agreeable to return for outpt  CM did stress the importance of compliance with outpt and medication  Ngoc did state she would be compliant

## 2017-10-19 NOTE — PROGRESS NOTES
Progress Note - [id number] Lucy Braden 32 y o  female MRN: [id number]   Unit/Bed#: FF6 765-01 Encounter: [id number]    Interviewed with [facility],  # [id number]    Behavior over the last 24 hours: minimal improvement  Shohola Siad states she feels slightly less depressed, but still has anxiety symptoms and paranoid thoughts  She still endorses auditory hallucinations and reports that "voices say to be careful"  She also reports visual hallucinations of "reflections"  Seems preoccupied and distracted  Seclusive to the room  Limited participation in milieu  Compliant with medications  Sleep: normal  Appetite: normal  Medication side effects: No   ROS: no complaints, denies any shortness of breath, chest pain or abdominal pain    Mental Status Evaluation:    Appearance:  casually dressed   Behavior:  cooperative, poor eye contact   Speech:  soft   Mood:  still anxious, slightly less depressed   Affect:  blunted   Thought Process:  illogical   Associations: concrete associations   Thought Content:  paranoid delusions   Perceptual Disturbances: auditory hallucinations of "voices saying to be careful", visual hallucinations of "reflections"   Risk Potential: Suicidal ideation - None  Homicidal ideation - None  Potential for aggression - No   Sensorium:  oriented to person, place and time/date   Memory:  recent and remote memory grossly intact   Consciousness:  alert and awake   Attention: poor concentration and poor attention span   Insight:  impaired   Judgment: impaired   Gait/Station: normal gait/station and normal balance   Motor Activity: no abnormal movements     Vital signs in last 24 hours:    Temp:  [97 7 °F (36 5 °C)-98 1 °F (36 7 °C)] 98 1 °F (36 7 °C)  HR:  [80-82] 80  Resp:  [16] 16  BP: (114-120)/(62) 120/62    Laboratory results:  I have personally reviewed all pertinent laboratory/tests results      Progress Toward Goals: minimal improvement, still anxious, slightly less depressed, still has psychotic symptoms    Assessment/Plan   Principal Problem:    Schizoaffective disorder, depressive type ([facility] [address] )  Active Problems:    BIANCA (generalized anxiety disorder)    Recommended Treatment:     Planned medication and treatment changes: All current active medications have been reviewed  Encourage group therapy, milieu therapy and occupational therapy  Behavioral Health checks every 15 minutes  Continue Latuda - plan to increase to 60 mg in the evening from tomorrow    Current medications:      levothyroxine 100 mcg Oral Early Morning   lurasidone 40 mg Oral Daily With Dinner       Risks / Benefits of Treatment:    Risks, benefits, and possible side effects of medications explained to patient and patient verbalizes understanding and agreement for treatment  Risks of medications in pregnancy explained if female patient  Patient verbalizes understanding and agrees to notify her doctor if she becomes pregnant  Counseling / Coordination of Care:    Patient's progress discussed with staff in treatment team meeting  Medications, treatment progress and treatment plan reviewed with patient

## 2017-10-19 NOTE — PLAN OF CARE
Alteration in Thoughts and Perception     Treatment Goal: Gain control of psychotic behaviors/thinking, reduce/eliminate presenting symptoms and demonstrate improved reality functioning upon discharge Not Progressing     Recognize dysfunctional thoughts, communicate reality-based thoughts at the time of discharge Not Progressing        Patient is delusional and having hallucinations  Patient has been appearing to respond to internal stimuli and is progressing with verbalizing symptoms     Alteration in Thoughts and Perception     Verbalize thoughts and feelings Progressing     Refrain from acting on delusional thinking/internal stimuli Progressing     Agree to be compliant with medication regime, as prescribed and report medication side effects Progressing     Complete daily ADLs, including personal hygiene independently, as able [address] Discharge to home or other facility with appropriate resources Progressing        Ineffective Coping     Identifies ineffective coping skills Progressing     Identifies healthy coping skills Progressing     Participates in unit activities Progressing     Participates in unit activities Progressing     Patient/Family participate in treatment and DC plans Progressing        Risk for Violence/Aggression Toward Others     Treatment Goal: Refrain from acts of violence/aggression during length of stay, and demonstrate improved impulse control at the time of discharge Progressing     Refrain from destructive acts on the environment or property Progressing

## 2017-10-19 NOTE — PROGRESS NOTES
RN spoke with patient via  (#[id number])  Patient reports that she feels well and wants to go home  Patient reports heard a voice of someone saying "look out"  Patient reports she "sees the face of someone" that she does not recognize  She reports seeing "him" behind her and is not sure if "he" is in her "head"  Patient reports having anxiety denies depression  Patient reports feeling fatigued, she believes she feels fatigued because she has a "heavy body" and she needs to "lose weight"  Patient denies SI and HI  Patient has been in her room reading a book in [place]  She reports that she can read english "very slowly"  Patient has bright and pleasant affect  Pt is calm and cooperative with care

## 2017-10-20 RX ADMIN — LEVOTHYROXINE SODIUM 100 MCG: 100 TABLET ORAL at 06:31

## 2017-10-20 RX ADMIN — LURASIDONE HYDROCHLORIDE 60 MG: 40 TABLET, FILM COATED ORAL at 19:02

## 2017-10-20 NOTE — PROGRESS NOTES
Patient reports AH of people talking  She believes the voices are talking to other people not to her  Patient denies having visual hallucinations and does not recall reporting visual hallucinations yesterday  Patient reports that the noises around the unit make her nervous  She reports hearing people slam doors shout and say curse words  Patient has a bright affect and is calm and cooperative

## 2017-10-20 NOTE — PLAN OF CARE
Alteration in Thoughts and Perception     Treatment Goal: Gain control of psychotic behaviors/thinking, reduce/eliminate presenting symptoms and demonstrate improved reality functioning upon discharge Not Progressing     Recognize dysfunctional thoughts, communicate reality-based thoughts at the time of discharge Not Progressing        Patient is paranoid and having AH with a lack of insight into her condition  Alteration in Thoughts and Perception     Verbalize thoughts and feelings Progressing     Refrain from acting on delusional thinking/internal stimuli Progressing     Agree to be compliant with medication regime, as prescribed and report medication side effects Progressing     Complete daily ADLs, including personal hygiene independently, as able [address] Discharge to home or other facility with appropriate resources Progressing        Ineffective Coping     Identifies ineffective coping skills Progressing     Identifies healthy coping skills Progressing     Participates in unit activities Progressing     Participates in unit activities Progressing     Patient/Family participate in treatment and DC plans Progressing        Risk for Violence/Aggression Toward Others     Treatment Goal: Refrain from acts of violence/aggression during length of stay, and demonstrate improved impulse control at the time of discharge Progressing     Refrain from destructive acts on the environment or property Progressing

## 2017-10-20 NOTE — CASE MANAGEMENT
ANABELLA met with Ngoc to discuss discharge planning  CM reviewed intake appointment at [facility] and she was agreeable  Ngoc was also agreeable to added support and referral for an ICM  ZHOU signed for [facility]  ANABELLA called [facility] to confirm they have Pashto speaking ICM's which they confirmed  CM faxed referral and clinical packet to [facility] at fax [phone]  Phone [phone]

## 2017-10-20 NOTE — PROGRESS NOTES
Progress Note - [id number] Lucy Braden 32 y o  female MRN: [id number]   Unit/Bed#: UC2 765-01 Encounter: [id number]    Interviewed with [facility],  # [id number] and [id number]    Behavior over the last 24 hours: limited improvement  [facility] feels slightly better today  She is less anxious and less depressed  Still reports auditory hallucinations of "noises", still seems paranoid and preoccupied at times  Denies suicidal thoughts  Compliant with medications  Attends groups more often  Sleep: difficulty falling asleep  Appetite: normal  Medication side effects: No   ROS: no complaints, denies any shortness of breath, chest pain or nausea    Mental Status Evaluation:    Appearance:  casually dressed   Behavior:  cooperative, slightly better eye contact   Speech:  soft   Mood:  less anxious, less depressed   Affect:  blunted   Thought Process:  more logical   Associations: concrete associations   Thought Content:  paranoid ideation   Perceptual Disturbances: auditory hallucinations of "noises", appears preoccupied, no visual hallucinations   Risk Potential: Suicidal ideation - None  Homicidal ideation - None  Potential for aggression - No   Sensorium:  oriented to person, place and time/date   Memory:  recent and remote memory grossly intact   Consciousness:  alert and awake   Attention: decreased concentration and decreased attention span   Insight:  impaired   Judgment: impaired   Gait/Station: normal gait/station and normal balance   Motor Activity: no abnormal movements     Vital signs in last 24 hours:    Temp:  [97 9 °F (36 6 °C)-98 °F (36 7 °C)] 97 9 °F (36 6 °C)  HR:  [82-90] 90  Resp:  [16] 16  BP: (116-119)/(65-66) 119/66    Laboratory results:  I have personally reviewed all pertinent laboratory/tests results      Progress Toward Goals: progressing slowly, less anxious, less depressed, still has psychotic symptoms    Assessment/Plan   Principal Problem:    [facility]ive disorder, [facility] )  Active Problems:    BIANCA (generalized anxiety disorder)    Recommended Treatment:     Planned medication and treatment changes: All current active medications have been reviewed  Continue treatment with group therapy, milieu therapy and occupational therapy  Behavioral Health checks every 15 minutes  Increase Latuda to 60 mg in the evening    Continue all other medications:      levothyroxine 100 mcg Oral Early Morning   lurasidone 40 mg Oral Daily With Dinner       Risks / Benefits of Treatment:    Risks, benefits, and possible side effects of medications explained to patient and patient verbalizes understanding and agreement for treatment  Risks of medications in pregnancy explained if female patient  Patient verbalizes understanding and agrees to notify her doctor if she becomes pregnant  Counseling / Coordination of Care:    Patient's progress discussed with staff in treatment team meeting  Medications, treatment progress and treatment plan reviewed with patient  Medication changes discussed with patient

## 2017-10-21 RX ADMIN — LEVOTHYROXINE SODIUM 100 MCG: 100 TABLET ORAL at 06:28

## 2017-10-21 RX ADMIN — LURASIDONE HYDROCHLORIDE 60 MG: 40 TABLET, FILM COATED ORAL at 16:26

## 2017-10-21 NOTE — PLAN OF CARE
Problem: DISCHARGE PLANNING  Goal: Discharge to home or other facility with appropriate resources  INTERVENTIONS:  - Identify barriers to discharge w/patient and caregiver  - Arrange for needed discharge resources and transportation as appropriate  - Identify discharge learning needs (meds, wound care, etc )  - Arrange for interpretive services to assist at discharge as needed  - Refer to Case Management Department for coordinating discharge planning if the patient needs post-hospital services based on physician/advanced practitioner order or complex needs related to functional status, cognitive ability, or social support system   Outcome: Progressing      Problem: Ineffective Coping  Goal: Identifies ineffective coping skills  Outcome: Progressing    Goal: Identifies healthy coping skills  Outcome: Progressing    Goal: Participates in unit activities  Interventions:  - Provide therapeutic environment   - Provide required programming   - Redirect inappropriate behaviors    Outcome: Progressing    Goal: Participates in unit activities  Interventions:  - Provide therapeutic environment   - Provide required programming   - Redirect inappropriate behaviors    Outcome: Progressing    Goal: Patient/Family participate in treatment and DC plans  Interventions:  - Provide therapeutic environment   Outcome: Progressing      Problem: Alteration in Thoughts and Perception  Goal: Treatment Goal: Gain control of psychotic behaviors/thinking, reduce/eliminate presenting symptoms and demonstrate improved reality functioning upon discharge  Outcome: Progressing    Goal: Verbalize thoughts and feelings  Interventions:  - Promote a nonjudgmental and trusting relationship with the patient through active listening and therapeutic communication  - Assess patient's level of functioning, behavior and potential for risk  - Engage patient in 1 on 1 interactions for a minimum of 15 minutes each session  - Encourage patient to express fears, feelings, frustrations, and discuss symptoms    - [place] patient to reality, help patient recognize reality-based thinking   - Administer medications as ordered and assess for potential side effects  - Provide the patient education related to the signs and symptoms of the illness and desired effects of prescribed medications   Outcome: Progressing    Goal: Refrain from acting on delusional thinking/internal stimuli  Interventions:  - Monitor patient closely, per order   - Utilize least restrictive measures   - Set reasonable limits, give positive feedback for acceptable   - Administer medications as ordered and monitor of potential side effects   Outcome: Progressing    Goal: Agree to be compliant with medication regime, as prescribed and report medication side effects  Interventions:  - Offer appropriate PRN medication and supervise ingestion; conduct aims, as needed    Outcome: Progressing    Goal: Recognize dysfunctional thoughts, communicate reality-based thoughts at the time of discharge  Interventions:  - Provide medication and psycho-education to assist patient in compliance and developing insight into his/her illness    Outcome: Progressing      Problem: Risk for Violence/Aggression Toward Others  Goal: Treatment Goal: Refrain from acts of violence/aggression during length of stay, and demonstrate improved impulse control at the time of discharge  Outcome: Progressing    Goal: Refrain from destructive acts on the environment or property  Outcome: Progressing

## 2017-10-21 NOTE — PROGRESS NOTES
Spoke with patient via [address] [facility] speaking psychiatrist Dr Hermelinda Patricio  Patient denies SI as well as AH but appears to be responding to internal stimuli  Inappropriate smile at times  A bit somatic  Patient c/o feeling dizzy as well as having "chills"  VS WNL  Will continue to monitor

## 2017-10-21 NOTE — PROGRESS NOTES
Pt's mom and dad requested to speak about pt  Pt updated family on status and medications  Pt has been out in patient lounge building a puzzle  Parent's requested that patient be moved to a different room because pt is "uncomfortable" in that room w/ her  Pt still has paranoid ideation

## 2017-10-21 NOTE — PROGRESS NOTES
Pt stated she does not speak english  Pt stated "No" when asked to use blue interrupter phone  Pt is seculsive to room and did not come out for dinner or snack  Pt is in her room reading a book that is written in [address] asked if she read english pt stated "No " Pt did come out for a visit w/ her mom and sister
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
## 2017-10-22 RX ORDER — LORATADINE 10 MG/1
10 TABLET ORAL DAILY
Status: DISCONTINUED | OUTPATIENT
Start: 2017-10-22 | End: 2017-10-28

## 2017-10-22 RX ADMIN — ALUMINUM HYDROXIDE, MAGNESIUM HYDROXIDE, AND SIMETHICONE 30 ML: 200; 200; 20 SUSPENSION ORAL at 18:58

## 2017-10-22 RX ADMIN — LORATADINE 10 MG: 10 TABLET ORAL at 11:47

## 2017-10-22 RX ADMIN — LURASIDONE HYDROCHLORIDE 60 MG: 40 TABLET, FILM COATED ORAL at 18:58

## 2017-10-22 RX ADMIN — LEVOTHYROXINE SODIUM 100 MCG: 100 TABLET ORAL at 06:11

## 2017-10-22 RX ADMIN — IBUPROFEN 800 MG: 400 TABLET, FILM COATED ORAL at 14:48
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
## 2017-10-22 NOTE — PROGRESS NOTES
Spoke with patient via [address] [facility] speaking staff  Patient c/o b/l knee pain stating that she fell 2 years ago and believes it is "nerve pain"  Continues to appear suspicious and noted laughing/smiling to self while walking down the thomas

## 2017-10-23 RX ADMIN — LORATADINE 10 MG: 10 TABLET ORAL at 08:54

## 2017-10-23 RX ADMIN — LEVOTHYROXINE SODIUM 100 MCG: 100 TABLET ORAL at 06:01

## 2017-10-23 RX ADMIN — LURASIDONE HYDROCHLORIDE 60 MG: 40 TABLET, FILM COATED ORAL at 16:45

## 2017-10-23 RX ADMIN — LORAZEPAM 1 MG: 1 TABLET ORAL at 20:00

## 2017-10-23 NOTE — PROGRESS NOTES
Pt has been out working a puzzle this shift  Pt had a visitor w/ no issues  Pt has bright affect  Pt had more complaints about her stomach

## 2017-10-23 NOTE — PLAN OF CARE
Problem: DISCHARGE PLANNING  Goal: Discharge to home or other facility with appropriate resources  INTERVENTIONS:  - Identify barriers to discharge w/patient and caregiver  - Arrange for needed discharge resources and transportation as appropriate  - Identify discharge learning needs (meds, wound care, etc )  - Arrange for interpretive services to assist at discharge as needed  - Refer to Case Management Department for coordinating discharge planning if the patient needs post-hospital services based on physician/advanced practitioner order or complex needs related to functional status, cognitive ability, or social support system   Outcome: Progressing      Problem: Ineffective Coping  Goal: Identifies ineffective coping skills  Outcome: Progressing    Goal: Identifies healthy coping skills  Outcome: Progressing    Goal: Participates in unit activities  Interventions:  - Provide therapeutic environment   - Provide required programming   - Redirect inappropriate behaviors    Outcome: Progressing    Goal: Participates in unit activities  Interventions:  - Provide therapeutic environment   - Provide required programming   - Redirect inappropriate behaviors    Outcome: Progressing    Goal: Patient/Family participate in treatment and DC plans  Interventions:  - Provide therapeutic environment   Outcome: Progressing      Problem: Alteration in Thoughts and Perception  Goal: Treatment Goal: Gain control of psychotic behaviors/thinking, reduce/eliminate presenting symptoms and demonstrate improved reality functioning upon discharge  Outcome: Progressing    Goal: Verbalize thoughts and feelings  Interventions:  - Promote a nonjudgmental and trusting relationship with the patient through active listening and therapeutic communication  - Assess patient's level of functioning, behavior and potential for risk  - Engage patient in 1 on 1 interactions for a minimum of 15 minutes each session  - Encourage patient to express fears, feelings, frustrations, and discuss symptoms    - [place] patient to reality, help patient recognize reality-based thinking   - Administer medications as ordered and assess for potential side effects  - Provide the patient education related to the signs and symptoms of the illness and desired effects of prescribed medications   Outcome: Progressing    Goal: Refrain from acting on delusional thinking/internal stimuli  Interventions:  - Monitor patient closely, per order   - Utilize least restrictive measures   - Set reasonable limits, give positive feedback for acceptable   - Administer medications as ordered and monitor of potential side effects   Outcome: Progressing    Goal: Agree to be compliant with medication regime, as prescribed and report medication side effects  Interventions:  - Offer appropriate PRN medication and supervise ingestion; conduct aims, as needed    Outcome: Progressing    Goal: Recognize dysfunctional thoughts, communicate reality-based thoughts at the time of discharge  Interventions:  - Provide medication and psycho-education to assist patient in compliance and developing insight into his/her illness    Outcome: Progressing    Goal: Complete daily ADLs, including personal hygiene independently, as able  Interventions:  - Observe, teach, and assist patient with ADLS  - Monitor and promote a balance of rest/activity, with adequate nutrition and elimination    Outcome: Progressing      Problem: Risk for Violence/Aggression Toward Others  Goal: Treatment Goal: Refrain from acts of violence/aggression during length of stay, and demonstrate improved impulse control at the time of discharge  Outcome: Progressing    Goal: Refrain from destructive acts on the environment or property  Outcome: Progressing

## 2017-10-23 NOTE — PLAN OF CARE
Problem: DISCHARGE PLANNING  Goal: Discharge to home or other facility with appropriate resources  INTERVENTIONS:  - Identify barriers to discharge w/patient and caregiver  - Arrange for needed discharge resources and transportation as appropriate  - Identify discharge learning needs (meds, wound care, etc )  - Arrange for interpretive services to assist at discharge as needed  - Refer to Case Management Department for coordinating discharge planning if the patient needs post-hospital services based on physician/advanced practitioner order or complex needs related to functional status, cognitive ability, or social support system   Outcome: Progressing      Problem: Ineffective Coping  Goal: Identifies ineffective coping skills  Outcome: Progressing    Goal: Identifies healthy coping skills  Outcome: Progressing    Goal: Participates in unit activities  Interventions:  - Provide therapeutic environment   - Provide required programming   - Redirect inappropriate behaviors    Outcome: Progressing    Goal: Participates in unit activities  Interventions:  - Provide therapeutic environment   - Provide required programming   - Redirect inappropriate behaviors    Outcome: Progressing    Goal: Patient/Family participate in treatment and DC plans  Interventions:  - Provide therapeutic environment   Outcome: Progressing      Problem: Alteration in Thoughts and Perception  Goal: Treatment Goal: Gain control of psychotic behaviors/thinking, reduce/eliminate presenting symptoms and demonstrate improved reality functioning upon discharge  Outcome: Progressing    Goal: Verbalize thoughts and feelings  Interventions:  - Promote a nonjudgmental and trusting relationship with the patient through active listening and therapeutic communication  - Assess patient's level of functioning, behavior and potential for risk  - Engage patient in 1 on 1 interactions for a minimum of 15 minutes each session  - Encourage patient to express fears, feelings, frustrations, and discuss symptoms    - [place] patient to reality, help patient recognize reality-based thinking   - Administer medications as ordered and assess for potential side effects  - Provide the patient education related to the signs and symptoms of the illness and desired effects of prescribed medications   Outcome: Progressing    Goal: Refrain from acting on delusional thinking/internal stimuli  Interventions:  - Monitor patient closely, per order   - Utilize least restrictive measures   - Set reasonable limits, give positive feedback for acceptable   - Administer medications as ordered and monitor of potential side effects   Outcome: Progressing    Goal: Agree to be compliant with medication regime, as prescribed and report medication side effects  Interventions:  - Offer appropriate PRN medication and supervise ingestion; conduct aims, as needed    Outcome: Progressing    Goal: Recognize dysfunctional thoughts, communicate reality-based thoughts at the time of discharge  Interventions:  - Provide medication and psycho-education to assist patient in compliance and developing insight into his/her illness    Outcome: Progressing      Problem: Risk for Violence/Aggression Toward Others  Goal: Treatment Goal: Refrain from acts of violence/aggression during length of stay, and demonstrate improved impulse control at the time of discharge  Outcome: Progressing    Goal: Refrain from destructive acts on the environment or property  Outcome: Progressing
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
## 2017-10-23 NOTE — PROGRESS NOTES
Progress Note - [id number] Lucy Braden 32 y o  female MRN: @MRN   Unit/Bed#: JW4 765-01 Encounter: [id number]    Patient is still paranoid, reporting to be RIS  Blue phone dictation services were used in this interview  Translation by Charisse Loja with ID ending in [place] 65 reports today that She is fine  She was very guarded and minimizing  States that she doesn't hear anything or anyone other than the people out in the hallway or in the rooms adjacent to her  Denies any responding to internal stimuli  Denies any visual hallucinations  Denies any thought broadcasting or thought insertion or ideas of reference  When asked about her concerns related to her brother and father she ended indicated "I'm worried  I can't explain why  I just feel worried"  I tried to explore her mental health and her concerns, but was unable to get more deep with her  She denies issues with meds other than feeling dizzy in AM, but then states she has always had this issue, not since starting meds  She had concerns about her thyroid medication that were vague, but I reviewed with her the dosing and recent TSH levels  She seemed comforted by this review  Energy is low, appetite is up and down, sleep is better and reported good last night        Sleep: improved  Appetite: poor  Medication side effects: No (dizziness seems to precede and she indicates is related to anxiety)  ROS: as noted    Mental Status Evaluation:    Appearance:  age appropriate   Behavior:  guarded   Speech:  regular rate and rhythm   Mood:  dysphoric, anxious   Affect:  constricted       Thought Process:  circumstantial   Associations: circumstantial associations   Thought Content:  somatic delusions   Perceptual Disturbances: staff reports seeing her RIS, but not seen in room   Risk Potential: Suicidal ideation - None  Homicidal ideation - None  Potential for aggression - No   Sensorium:  A&Ox3   Cognition:  grossly intact   Consciousness:  alert and awake Memory:  recent and remote memory grossly intact   Attention: attention span and concentration appear shorter than expected for age   Intellect: within normal limits       Insight:  impaired due to psychosis   Judgment: poor         Gait/Station: normal gait/station with good balance   Motor Activity: no abnormal movements     Vital signs in last 24 hours:    Temp:  [97 9 °F (36 6 °C)-98 °F (36 7 °C)] 98 °F (36 7 °C)  HR:  [85-93] 85  Resp:  [16] 16  BP: (115-136)/(66-82) 115/66    Laboratory results:  I have personally reviewed all pertinent laboratory/tests results  Progress Toward Goals:  slow improvement  Assessment/Plan   Principal Problem:    Schizoaffective disorder, depressive type (HCC)  Active Problems:    BIANCA (generalized anxiety disorder)      Steve Mendez is Presenting with reported absence of symptoms  Patient however is rather guarded and appears paranoid  Timothyarleenmarkelljavier Irwin just recently increased  Patient was adamant about not increasing tonight  She's been on the current dose for 3 days and I think it reasonable to give it some more time for better effect and because she is on a voluntary basis  However, I told her that this will likely be revisited  Recommended Treatment:     Planned medication and treatment changes: All current active medications have been reviewed  levothyroxine 100 mcg Oral Early Morning   loratadine 10 mg Oral Daily   lurasidone 60 mg Oral Daily With Dinner       1) continue current treatment  2) Continue to support patient and engage them in the programs available as possible and appropriate  Continue case management support and therapy  Continue discharge planning  Risks / Benefits of Treatment:    Risks, benefits, and possible side effects of medications explained to patient and patient verbalizes understanding and agreement for treatment  Counseling / Coordination of Care:    Patient's progress discussed with staff in treatment team meeting    Medications, treatment progress and treatment plan reviewed with patient        Yaquelin Culp III,   10/23/2017  3:52 PM

## 2017-10-23 NOTE — CASE MANAGEMENT
CM called [facility] CHEMICAL DEPENDENCY [facility] and left message on Jemima's  ( Clinical coordinator) regarding ICM referral which was faxed Friday October 20th  Phone [phone]

## 2017-10-24 RX ORDER — SERTRALINE HYDROCHLORIDE 25 MG/1
25 TABLET, FILM COATED ORAL ONCE
Status: COMPLETED | OUTPATIENT
Start: 2017-10-24 | End: 2017-10-24

## 2017-10-24 RX ADMIN — LORATADINE 10 MG: 10 TABLET ORAL at 08:21

## 2017-10-24 RX ADMIN — SERTRALINE HYDROCHLORIDE 25 MG: 25 TABLET ORAL at 12:42

## 2017-10-24 RX ADMIN — LEVOTHYROXINE SODIUM 100 MCG: 100 TABLET ORAL at 06:59

## 2017-10-24 NOTE — CASE MANAGEMENT
ANABELLA received a phone message from Canon juan at [facility] and she did confirm she received the ICM referral and said a staff person will be contacting ANABELLA to hopefully complete the intake while Ngoc is inpatient  Phone [phone]

## 2017-10-24 NOTE — PROGRESS NOTES
Patient is insistent on leaving tonight  Patient states that she doesn't feel safe here and the voices of the other patients scare her  Patient requested that I meet with her family whom she has signed ROIs for  Patient denies all symptoms  Patient appears to be responding to internal stimuli  Her family believes that she is hearing voices they reports he is complaining of voices when no one is speaking  Family is concerned that patient is not being honest and that patient is not ready for discharge  Family reports that they do not believe she has improved  Patient is in her room reading

## 2017-10-24 NOTE — PROGRESS NOTES
Spoke with patient via language line, ID # [id number]  Patient paranoid and suspicious  Saying that she doesn't want to be a [place] citizen anymore and demanding RN to call someone and tell them that she doesn't want to be a citizen  When RN informed patient that she could not make that phone call for her, patient became agitated and asking RN why  Patient appears to be responding to internal stimuli although denies AH

## 2017-10-24 NOTE — CASE MANAGEMENT
ANABELLA spoke to patients sister Aníbal Lamb and updated  She said she and her mother visited [facility] last evening and said her mother doesn't feel Macario has improved and continues "stressed,anxious and paranoid"  She also said Ngoc c/o pain in her thyroid area   ANABELLA told Karen this information will passed on to the psychiatrist

## 2017-10-24 NOTE — PROGRESS NOTES
Through  [id number], patient denied all symptoms  Patient was instructed by me that her Patrick Radha was increased from 60 to 80 mg and I would be administering it  with dinner  Patient stated that she spoke with psychiatrist and she was not going to increase the Patrick Radha  I explained that psychiatrist did indeed increase the dose and possibly due to patient previously reporting still hearing voices  Patient then stated through , "I don't hear voices in my head but out in the hallway at night " I explained that the dose for this evening is 80 mg and will she take it and patient stated, "No " Then patient asked when she was going home and I explained that she needs to ask psychiatrist that question tomorrow and refusing medications is not helping her care move forward  Patient still refused to take evening dose

## 2017-10-24 NOTE — PLAN OF CARE
Problem: DISCHARGE PLANNING  Goal: Discharge to home or other facility with appropriate resources  INTERVENTIONS:  - Identify barriers to discharge w/patient and caregiver  - Arrange for needed discharge resources and transportation as appropriate  - Identify discharge learning needs (meds, wound care, etc )  - Arrange for interpretive services to assist at discharge as needed  - Refer to Case Management Department for coordinating discharge planning if the patient needs post-hospital services based on physician/advanced practitioner order or complex needs related to functional status, cognitive ability, or social support system   Outcome: Progressing      Problem: Ineffective Coping  Goal: Identifies ineffective coping skills  Outcome: Progressing    Goal: Identifies healthy coping skills  Outcome: Progressing    Goal: Participates in unit activities  Interventions:  - Provide therapeutic environment   - Provide required programming   - Redirect inappropriate behaviors    Outcome: Progressing    Goal: Participates in unit activities  Interventions:  - Provide therapeutic environment   - Provide required programming   - Redirect inappropriate behaviors    Outcome: Progressing    Goal: Patient/Family participate in treatment and DC plans  Interventions:  - Provide therapeutic environment   Outcome: Progressing      Problem: Alteration in Thoughts and Perception  Goal: Treatment Goal: Gain control of psychotic behaviors/thinking, reduce/eliminate presenting symptoms and demonstrate improved reality functioning upon discharge  Outcome: Progressing    Goal: Verbalize thoughts and feelings  Interventions:  - Promote a nonjudgmental and trusting relationship with the patient through active listening and therapeutic communication  - Assess patient's level of functioning, behavior and potential for risk  - Engage patient in 1 on 1 interactions for a minimum of 15 minutes each session  - Encourage patient to express fears, feelings, frustrations, and discuss symptoms    - [place] patient to reality, help patient recognize reality-based thinking   - Administer medications as ordered and assess for potential side effects  - Provide the patient education related to the signs and symptoms of the illness and desired effects of prescribed medications   Outcome: Progressing    Goal: Refrain from acting on delusional thinking/internal stimuli  Interventions:  - Monitor patient closely, per order   - Utilize least restrictive measures   - Set reasonable limits, give positive feedback for acceptable   - Administer medications as ordered and monitor of potential side effects   Outcome: Progressing    Goal: Agree to be compliant with medication regime, as prescribed and report medication side effects  Interventions:  - Offer appropriate PRN medication and supervise ingestion; conduct aims, as needed    Outcome: Progressing    Goal: Recognize dysfunctional thoughts, communicate reality-based thoughts at the time of discharge  Interventions:  - Provide medication and psycho-education to assist patient in compliance and developing insight into his/her illness    Outcome: Progressing    Goal: Complete daily ADLs, including personal hygiene independently, as able  Interventions:  - Observe, teach, and assist patient with ADLS  - Monitor and promote a balance of rest/activity, with adequate nutrition and elimination    Outcome: Progressing      Problem: Risk for Violence/Aggression Toward Others  Goal: Treatment Goal: Refrain from acts of violence/aggression during length of stay, and demonstrate improved impulse control at the time of discharge  Outcome: Progressing    Goal: Refrain from destructive acts on the environment or property  Outcome: Progressing

## 2017-10-24 NOTE — PROGRESS NOTES
Progress Note - [id number] Lucy Braden 32 y o  female MRN: [id number]   Unit/Bed#: HW2 764-01 Encounter: [id number]    Interviewed with Language Line,  # [id number]    Behavior over the last 24 hours: limited improvement  Ryland Hall is still paranoid and preoccupied  Per staff report she still appears responding to internal stimuli  Seems somewhat irritable today, states she does not feels "safe  There is something going on here" and is preoccupied with discharge  She still reports vague auditory hallucinations of "noises"  States she has not been eating much "my appetite is not good"  Limited participation in milieu  Has been taking medications, but has poor insight into her illness and needed treatment  She is focusing on her levothyroxine dose and feels it is "too high" despite reassurance that her TSH was normal on her current dose      Sleep: normal  Appetite: decreased  Medication side effects: No   ROS: reports sweating, denies any shortness of breath or chest pain    Mental Status Evaluation:    Appearance:  casually dressed   Behavior:  cooperative, limited eye contact   Speech:  soft   Mood:  dysphoric, anxious   Affect:  constricted   Thought Process:  concrete   Associations: concrete associations   Thought Content:  paranoid delusions   Perceptual Disturbances: auditory hallucinations of "noises", no visual hallucinations   Risk Potential: Suicidal ideation - None  Homicidal ideation - None  Potential for aggression - No   Sensorium:  oriented to person, place and time/date   Memory:  recent and remote memory grossly intact   Consciousness:  alert and awake   Attention: decreased concentration and decreased attention span   Insight:  impaired   Judgment: impaired   Gait/Station: normal gait/station and normal balance   Motor Activity: no abnormal movements     Vital signs in last 24 hours:    Temp:  [97 5 °F (36 4 °C)-98 °F (36 7 °C)] 97 5 °F (36 4 °C)  HR:  [85-96] 96  Resp:  [16] 16  BP: (773-124)/(63-79 124/79    Laboratory results:  I have personally reviewed all pertinent laboratory/tests results  Progress Toward Goals: limited progress, still anxious, insight remains poor, still has psychotic symptoms    Assessment/Plan   Principal Problem:    Schizoaffective disorder, depressive type ([facility] [address] )  Active Problems:    BIANCA (generalized anxiety disorder)    Recommended Treatment:     Planned medication and treatment changes: All current active medications have been reviewed  Encourage group therapy, milieu therapy and occupational therapy  Behavioral Health checks every 15 minutes  Increase Latuda to 80 mg in the evening  Start Zoloft 25 mg daily and titrate dose to 50 mg daily     Continue all other medications:      levothyroxine 100 mcg Oral Early Morning   loratadine 10 mg Oral Daily   lurasidone 80 mg Oral Daily With Dinner   sertraline 25 mg Oral Once   [START ON 10/25/2017] sertraline 50 mg Oral Daily       Risks / Benefits of Treatment:    Risks, benefits, and possible side effects of medications explained to patient and patient verbalizes understanding and agreement for treatment  Risks of medications in pregnancy explained if female patient  Patient verbalizes understanding and agrees to notify her doctor if she becomes pregnant  Counseling / Coordination of Care: Total floor / unit time spent today 35 minutes  Greater than 50% of total time was spent with the patient and / or family counseling and / or coordination of care  A description of counseling / coordination of care:    Patient's progress discussed with staff in treatment team meeting  Medications, treatment progress and treatment plan reviewed with patient  Medication changes discussed with patient  Coping with ongoing anxiety and prolonged hospitalization reviewed with patient  Coping strategies including reducing physical symptoms of anxiety reviewed with patient    Importance of medication and treatment compliance reviewed with patient  Reassurance and support provided

## 2017-10-25 RX ADMIN — LEVOTHYROXINE SODIUM 100 MCG: 100 TABLET ORAL at 06:45

## 2017-10-25 RX ADMIN — ALUMINUM HYDROXIDE, MAGNESIUM HYDROXIDE, AND SIMETHICONE 30 ML: 200; 200; 20 SUSPENSION ORAL at 11:40

## 2017-10-25 RX ADMIN — SERTRALINE HYDROCHLORIDE 50 MG: 50 TABLET ORAL at 08:45

## 2017-10-25 RX ADMIN — LURASIDONE HYDROCHLORIDE 60 MG: 40 TABLET, FILM COATED ORAL at 17:03

## 2017-10-25 NOTE — PLAN OF CARE
Problem: DISCHARGE PLANNING  Goal: Discharge to home or other facility with appropriate resources  INTERVENTIONS:  - Identify barriers to discharge w/patient and caregiver  - Arrange for needed discharge resources and transportation as appropriate  - Identify discharge learning needs (meds, wound care, etc )  - Arrange for interpretive services to assist at discharge as needed  - Refer to Case Management Department for coordinating discharge planning if the patient needs post-hospital services based on physician/advanced practitioner order or complex needs related to functional status, cognitive ability, or social support system   Outcome: Progressing      Problem: Ineffective Coping  Goal: Identifies ineffective coping skills  Outcome: Progressing    Goal: Identifies healthy coping skills  Outcome: Progressing    Goal: Participates in unit activities  Interventions:  - Provide therapeutic environment   - Provide required programming   - Redirect inappropriate behaviors    Outcome: Progressing    Goal: Participates in unit activities  Interventions:  - Provide therapeutic environment   - Provide required programming   - Redirect inappropriate behaviors    Outcome: Progressing    Goal: Patient/Family participate in treatment and DC plans  Interventions:  - Provide therapeutic environment   Outcome: Progressing      Problem: Alteration in Thoughts and Perception  Goal: Treatment Goal: Gain control of psychotic behaviors/thinking, reduce/eliminate presenting symptoms and demonstrate improved reality functioning upon discharge  Outcome: Progressing    Goal: Verbalize thoughts and feelings  Interventions:  - Promote a nonjudgmental and trusting relationship with the patient through active listening and therapeutic communication  - Assess patient's level of functioning, behavior and potential for risk  - Engage patient in 1 on 1 interactions for a minimum of 15 minutes each session  - Encourage patient to express fears, feelings, frustrations, and discuss symptoms    - [place] patient to reality, help patient recognize reality-based thinking   - Administer medications as ordered and assess for potential side effects  - Provide the patient education related to the signs and symptoms of the illness and desired effects of prescribed medications   Outcome: Progressing    Goal: Refrain from acting on delusional thinking/internal stimuli  Interventions:  - Monitor patient closely, per order   - Utilize least restrictive measures   - Set reasonable limits, give positive feedback for acceptable   - Administer medications as ordered and monitor of potential side effects   Outcome: Progressing    Goal: Agree to be compliant with medication regime, as prescribed and report medication side effects  Interventions:  - Offer appropriate PRN medication and supervise ingestion; conduct aims, as needed    Outcome: Progressing    Goal: Recognize dysfunctional thoughts, communicate reality-based thoughts at the time of discharge  Interventions:  - Provide medication and psycho-education to assist patient in compliance and developing insight into his/her illness    Outcome: Progressing    Goal: Complete daily ADLs, including personal hygiene independently, as able  Interventions:  - Observe, teach, and assist patient with ADLS  - Monitor and promote a balance of rest/activity, with adequate nutrition and elimination    Outcome: Progressing      Problem: Risk for Violence/Aggression Toward Others  Goal: Treatment Goal: Refrain from acts of violence/aggression during length of stay, and demonstrate improved impulse control at the time of discharge  Outcome: Progressing    Goal: Refrain from destructive acts on the environment or property  Outcome: Progressing

## 2017-10-25 NOTE — PROGRESS NOTES
Spoke with patient with the use of interpretor [id number] on language line  Patient appeared irritable this am  But patient denies feeling suicidal or homicidal and stated again, "The voices I heard were not in my head but in the hallway " Patient went on and on about when she can leave and she also stated, "The medication I got this morning made me feel weird  " I asked patient to describe what she meant and she described what sounded like heartburn  I gave her Maalox as needed and patient stated, "Do I have to take my evening med and do I have to take the med I got this morning  The doctor said if I don't take my meds I will have to stay here longer " I explained that if the doctor said this then it is correct, since she is her doctor in charge of her care  I explained that taking medications while here gives both staff and doctor a chance to see how she responds to it and her body needs some time to adjust to these medications  I also told her I would give her a handout in Chinese on the medication she received this morning and she claims she never received a handout  Patient was given a handout in Chinese because the RN told me days ago it was given

## 2017-10-25 NOTE — PROGRESS NOTES
Patient insisted on eating in her room and did not want to eat with the other patients  T asked me if it was okay and I said let her eat in her room

## 2017-10-25 NOTE — PROGRESS NOTES
Progress Note - [id number] Lucy Braden 32 y o  female MRN: [id number]   Unit/Bed#: FI2 764-01 Encounter: [id number]    Interviewed with Language Line,  # [id number]    Behavior over the last 24 hours: jordan Grossman refused her Latuda last night  States today she "made a decision not to take medications"  She reports that she "did not see any change in anxiety level"  Her mother visited 2 days ago and reported to staff that family was concerned about Janki Vigil still being anxious, paranoid and not making progress  She still reports auditory hallucinations of "noises", is still preoccupied with "family issues"  Seems irritable and defensive today during the session  Poor sleep last night  Does not interact with peers in milieu  Stays in the room during the day      Sleep: insomnia   Appetite: decreased  Medication side effects: No   ROS: reports headache, denies any shortness of breath or chest pain    Mental Status Evaluation:    Appearance:  casually dressed   Behavior:  guarded   Speech:  soft   Mood:  anxious, irritable   Affect:  blunted   Thought Process:  concrete   Associations: concrete associations   Thought Content:  paranoid delusions   Perceptual Disturbances: auditory hallucinations of "noises", no visual hallucinations, appears responding to internal stimuli   Risk Potential: Suicidal ideation - None  Homicidal ideation - None  Potential for aggression - No   Sensorium:  oriented to person, place and time/date   Memory:  recent and remote memory grossly intact   Consciousness:  alert and awake   Attention: poor concentration and poor attention span   Insight:  poor   Judgment: poor   Gait/Station: normal gait/station and normal balance   Motor Activity: no abnormal movements     Vital signs in last 24 hours:    Temp:  [97 9 °F (36 6 °C)-98 1 °F (36 7 °C)] 98 1 °F (36 7 °C)  HR:  [61-72] 61  Resp:  [16] 16  BP: (114)/(68) 114/68    Laboratory results:  I have personally reviewed all pertinent laboratory/tests results  Progress Toward Goals: regressed, poor insight, still has psychotic symptoms    Assessment/Plan   Principal Problem:    Schizoaffective disorder, depressive type ([address] )  Active Problems:    BIANCA (generalized anxiety disorder)    Recommended Treatment:     Planned medication and treatment changes: All current active medications have been reviewed  Encourage group therapy, milieu therapy and occupational therapy  Behavioral Health checks every 15 minutes  Decrease Latuda to 60 mg in the evening - she agreed eventually to restart Latuda    Continue current medications:      levothyroxine 100 mcg Oral Early Morning   loratadine 10 mg Oral Daily   lurasidone 80 mg Oral Daily With Dinner   sertraline 50 mg Oral Daily       Risks / Benefits of Treatment:    Risks, benefits, and possible side effects of medications explained to patient  Patient has limited understanding of risks and benefits of treatment at this time, but agrees to take medications as prescribed  Risks of medications in pregnancy explained if female patient  Patient verbalizes understanding and agrees to notify her doctor if she becomes pregnant  Counseling / Coordination of Care: Total floor / unit time spent today 35 minutes  Greater than 50% of total time was spent with the patient and / or family counseling and / or coordination of care  A description of counseling / coordination of care:    Patient's progress discussed with staff in treatment team meeting  Medications, treatment progress and treatment plan reviewed with patient  Medication education provided to patient  Coping with need for medication adjustment and ongoing hospitalization reviewed with patient  Importance of medication and treatment compliance reviewed with patient  Discussed with patient acceptance of psychiatric diagnosis and need for maintenance medication to assure future stability  Reassurance and support provided

## 2017-10-26 RX ADMIN — LEVOTHYROXINE SODIUM 100 MCG: 100 TABLET ORAL at 05:51

## 2017-10-26 RX ADMIN — LURASIDONE HYDROCHLORIDE 80 MG: 80 TABLET, FILM COATED ORAL at 21:16

## 2017-10-26 NOTE — PLAN OF CARE
Alteration in Thoughts and Perception     Agree to be compliant with medication regime, as prescribed and report medication side effects Not Progressing        Patient declined medications this morning, saying she needs to talk to her family first      Alteration in Thoughts and Perception     Treatment Goal: Gain control of psychotic behaviors/thinking, reduce/eliminate presenting symptoms and demonstrate improved reality functioning upon discharge Progressing     Verbalize thoughts and feelings Progressing     Refrain from acting on delusional thinking/internal stimuli Progressing     Recognize dysfunctional thoughts, communicate reality-based thoughts at the time of discharge Progressing     Complete daily ADLs, including personal hygiene independently, as able Progressing        Ineffective Coping     Identifies ineffective coping skills Progressing     Identifies healthy coping skills Progressing     Patient/Family participate in treatment and DC plans Progressing        Risk for Violence/Aggression Toward Others     Treatment Goal: Refrain from acts of violence/aggression during length of stay, and demonstrate improved impulse control at the time of discharge Progressing     Refrain from destructive acts on the environment or property Progressing

## 2017-10-26 NOTE — PROGRESS NOTES
Progress Note - [id number] Lucy Braden 32 y o  female MRN: [id number]   Unit/Bed#: SI5 764-01 Encounter: [id number]    Interviewed with Language Line,  # [id number]    Behavior over the last 24 hours: clarence Aquino is still anxious and paranoid, still reports auditory hallucinations of "noises", also had visual hallucinations of  "people's reflection" yesterday  She refused Zoloft this morning; states "Zoloft did not work for me  I am not depressed"  She is still preoccupied with her weight and thinks she is "fat"; has not been eating much as per staff report  Attends some groups  Sleep: slept better  Appetite: decreased  Medication side effects: Yes - dizziness   ROS: reports dizziness, denies any shortness of breath or chest pain    Mental Status Evaluation:    Appearance:  casually dressed   Behavior:  guarded   Speech:  soft   Mood:  dysphoric, anxious   Affect:  blunted   Thought Process:  concrete   Associations: concrete associations   Thought Content:  paranoid delusions, preoccupied with her weight   Perceptual Disturbances: auditory hallucinations of "noises", visual hallucinations of "people's reflection"   Risk Potential: Suicidal ideation - None  Homicidal ideation - None  Potential for aggression - No   Sensorium:  oriented to person, place and time/date   Memory:  recent and remote memory grossly intact   Consciousness:  alert and awake   Attention: decreased concentration and decreased attention span   Insight:  impaired   Judgment: impaired   Gait/Station: normal gait/station and normal balance   Motor Activity: no abnormal movements     Vital signs in last 24 hours:    Temp:  [98 °F (36 7 °C)-98 5 °F (36 9 °C)] 98 °F (36 7 °C)  HR:  [75-91] 85  Resp:  [16] 16  BP: (117-140)/(68-86) 117/68    Laboratory results:  I have personally reviewed all pertinent laboratory/tests results      Progress Toward Goals: no progress, insight remains poor, still has psychotic symptoms    Assessment/Plan   Principal Problem:    Schizoaffective disorder, depressive type ([address] )  Active Problems:    BIANCA (generalized anxiety disorder)    Recommended Treatment:     Planned medication and treatment changes: All current active medications have been reviewed  Encourage group therapy, milieu therapy and occupational therapy  Behavioral Health checks every 15 minutes   to arrange for a family meeting with parents to review goals for discharge and current progress (family was concerned that she was not improving)  Consult Nutrition Services to help with diet choices  Increase Latuda to 80 mg in the evening  Discontinue Zoloft - she refuses to take it and refuses any other antidepressants    Continue all other medications:      levothyroxine 100 mcg Oral Early Morning   loratadine 10 mg Oral Daily   lurasidone 80 mg Oral Daily With Dinner       Risks / Benefits of Treatment:    Risks, benefits, and possible side effects of medications explained to patient  Patient has limited understanding of risks and benefits of treatment at this time and refuses Zoloft, but agrees to take [place] as prescribed  Risks of medications in pregnancy explained if female patient  Patient verbalizes understanding and agrees to notify her doctor if she becomes pregnant  Counseling / Coordination of Care:    Patient's progress discussed with staff in treatment team meeting  Medications, treatment progress and treatment plan reviewed with patient  Medication changes discussed with patient  Medication education provided to patient

## 2017-10-26 NOTE — PROGRESS NOTES
Patient refused medications this am , she states that she has to talk to her family first before she takes any medications  Seclusive to room   Umair Ellington Quiet but pleasant   Umair Ellington

## 2017-10-26 NOTE — CASE MANAGEMENT
Pt smiling, bright affect, anticipating family meeting tonite and looking forward to discharge next week

## 2017-10-26 NOTE — PROGRESS NOTES
Pt appears calm and relaxed  Pt reports 2/4 anxiety, "very depressed but deep inside, but I feel I can't express it", AH "I don't know what they are saying but I also try not to pay attention", VH "I see people's reflection  Do you think somebody has die", and LBM "today " Pt states "I don't know what medication they gave me earlier but I felt nauseous and dizzy  I feel the symptoms that this paper is saying I would " Pt was holding sertraline med education paperwork  Pt asked "I want to know about the medication, latuda  Is that going to help me? Do you know if Schizophrenia has a cure? Or Am I always going to hear voices?" Educated pt on disease process and med  Pt verbalized understanding and stated "I also want for you to speak with family later " Will continue to monitor

## 2017-10-26 NOTE — CASE MANAGEMENT
Spoke with pt's sister, Vidya Pena, who will call her parents for the family meeting that will occur sometime tonight between 6pm and 7pm with evening nurse Jimena MATT  who speaks [place] citizen fluently as the family had requested a family meeting with a [place] citizen-speaking professional and not the language line  Vidya Pena states that pt's parents are mainly afraid that the pt will stop taking her medication and stop going to her appts  She states they will do everything in their power to get the pt to go to her appts and continue with her psychiatric medications  Vidya Pena states that the pt is typically preoccupied with her weight and eating at home as well as on the unit  Vidya Pena: [phone]

## 2017-10-27 PROBLEM — F25.9 SCHIZOAFFECTIVE DISORDER (HCC): Status: ACTIVE | Noted: 2017-10-27

## 2017-10-27 PROBLEM — E07.9 DISEASE OF THYROID GLAND: Status: ACTIVE | Noted: 2017-10-16

## 2017-10-27 PROBLEM — E03.9 HYPOTHYROID: Status: ACTIVE | Noted: 2017-10-27

## 2017-10-27 PROBLEM — F25.0 SCHIZOAFFECTIVE DISORDER, BIPOLAR TYPE (HCC): Chronic | Status: ACTIVE | Noted: 2017-10-13

## 2017-10-27 LAB
T3FREE SERPL-MCNC: 1.79 PG/ML (ref 2.3–4.2)
T4 FREE SERPL-MCNC: 1.24 NG/DL (ref 0.76–1.46)
TSH SERPL DL<=0.05 MIU/L-ACNC: 7.31 UIU/ML (ref 0.36–3.74)

## 2017-10-27 PROCEDURE — 84443 ASSAY THYROID STIM HORMONE: CPT | Performed by: PSYCHIATRY & NEUROLOGY

## 2017-10-27 PROCEDURE — 84481 FREE ASSAY (FT-3): CPT | Performed by: PSYCHIATRY & NEUROLOGY

## 2017-10-27 PROCEDURE — 84439 ASSAY OF FREE THYROXINE: CPT | Performed by: PSYCHIATRY & NEUROLOGY

## 2017-10-27 RX ORDER — LEVOTHYROXINE SODIUM 0.12 MG/1
125 TABLET ORAL
Status: DISCONTINUED | OUTPATIENT
Start: 2017-10-28 | End: 2017-10-27

## 2017-10-27 RX ORDER — LEVOTHYROXINE SODIUM 112 UG/1
112 TABLET ORAL
Status: DISCONTINUED | OUTPATIENT
Start: 2017-10-28 | End: 2017-11-06 | Stop reason: HOSPADM

## 2017-10-27 RX ADMIN — LURASIDONE HYDROCHLORIDE 80 MG: 80 TABLET, FILM COATED ORAL at 18:20

## 2017-10-27 RX ADMIN — LEVOTHYROXINE SODIUM 100 MCG: 100 TABLET ORAL at 06:01

## 2017-10-27 RX ADMIN — LORATADINE 10 MG: 10 TABLET ORAL at 09:10

## 2017-10-27 NOTE — ASSESSMENT & PLAN NOTE
· TSH elevated at 7 310 with normal free T4 and slightly decreased T3 1 79  · Review of [facility] med rec indicates she has been compliant with taking levothyroxine  · Spoke with patients PCP office Dr Papo Talavera who advised to increase levothyroxine to 125 mcg daily  · With this change would recommend repeat Free T4/TSH in 6 weeks   · Recommend OP follow with her PCP for future monitoring and mgmt of hypothyroidism

## 2017-10-27 NOTE — CASE MANAGEMENT
CM called [facility] and left message with Jemima, clinical coordinator for [facility] services regarding the ICM referral which was faxed a week ago  Phone [phone]

## 2017-10-27 NOTE — PROGRESS NOTES
Progress Note - Charmayne Reynolds 32 y o  female MRN: [id number]    Unit/Bed#: CB3 764-01 Encounter: [id number]        Hypothyroid   Assessment & Plan    · TSH elevated at 7 310 with normal free T4 and slightly decreased T3 1 79  · Review of [facility] med rec indicates she has been compliant with taking levothyroxine and she admits to taking her meds at home on a regular basis  · Spoke with patients PCP office Dr Lois Wilkerson who advised to increase levothyroxine, will change to 112 mcg daily  · With this change would recommend repeat Free T4/TSH in 6 weeks, would recommend NO further thyroid testing this admission  T3 levels may be low (also known as T3 syndrome or euthyroid sick syndrome) in patients with an acute or chronic systemic illness and this level will likely return to normal as illness resolves  · Recommend OP follow with her PCP for future monitoring and mgmt of hypothyroidism   · Advised nursing to provide patient with education in [address] [facility] regarding her thyroid medication and hypothyroid disease  · Will sign off, no further acute medical interventions needed at this time  VTE Pharmacologic Prophylaxis:   Pharmacologic: patient ambulatory  Mechanical: Mechanical VTE prophylaxis in place  Was care plan discussed with the Primary service today?: Yes, discussed with nurse    Education and Discussions with Family / Patient: patient and provided printed education in [place]    Time Spent for Care: 20 minutes  More than 50% of total time spent on counseling and coordination of care as described above  Is patient acceptable for discharge from medicine standpoint?: Yes  Discharge Recommendations: follow up with PCP, need TSH/T4 in 6 weeks    Subjective:   Reports fatigue and weight gain  States compliant with thyroid meds at home  Reports being diagnosed with hypothyroidism as a child  Also reports intermittent pain in thyroid area, but points to trachea   Denies swallowing difficulty    Objective:     Vitals:   Temp (24hrs), Av °F (36 7 °C), Min:97 8 °F (36 6 °C), Max:98 1 °F (36 7 °C)    HR:  [66-73] 66  Resp:  [16] 16  BP: (106-112)/(57-62) 112/62  Body mass index is 25 51 kg/m²  Input and Output Summary (last 24 hours):     No intake or output data in the 24 hours ending 10/27/17 1346    Physical Exam:     Physical Exam   Constitutional: She is oriented to person, place, and time  She appears well-developed and well-nourished  No distress  Neck: No tracheal deviation present  No thyromegaly present  Cardiovascular: Normal rate and regular rhythm  No murmur heard  Pulmonary/Chest: Effort normal and breath sounds normal  No respiratory distress  She has no wheezes  Neurological: She is alert and oriented to person, place, and time  Skin: Skin is warm and dry  Vitals reviewed  Additional Data:     Labs: Invalid input(s): LABALBU        * I Have Reviewed All Lab Data Listed Above  * Additional Pertinent Lab Tests Reviewed: Gisela 66 Admission Reviewed    Imaging:    Imaging Reports Reviewed Today Include: n/a  Imaging Personally Reviewed by Myself Includes:  n/a    Cultures:   Blood Culture: No results found for: Taryn Port  Urine Culture: No results found for: URINECX  Sputum Culture: No components found for: SPUTUMCX  Wound Culture: No results found for: WOUNDCULT    Last 24 Hours Medication List:     [START ON 10/28/2017] levothyroxine 125 mcg Oral Early Morning   loratadine 10 mg Oral Daily   lurasidone 80 mg Oral Daily With Dinner        Today, Patient Was Seen By: EDINSON Kulkarni    ** Please Note:  Dictation voice to text software may have been used in the creation of this document   **

## 2017-10-27 NOTE — PROGRESS NOTES
Progress Note - [id number] Lucy Braden 32 y o  female MRN: [id number]   Unit/Bed#: ZW6 764-01 Encounter: [id number]    Interviewed with Claudia Humphrey Dr,  [id number]    Behavior over the last 24 hours: limited improvement  Ravinder Patel still reports auditory hallucinations of "songs repeating in my mind", seems still guarded and preoccupied, but denies feeling depressed and denies any suicidal ideation  Limited participation in milieu - she still does not come out much from her room, states she is "afraid of people"  Has been taking Latuda  Family meeting was held yesterday - parents felt that she was not at her baseline yet and were concerned about Ravinder Patel being noncompliant with her medications after her return home  They wanted Ngoc to be on long acting injectable antipsychotic, but she refused that option  Family also reported periods of elevated mood and decreased need for sleep in the past and were questioning possibility of schizoaffective disorder, bipolar type   Ravinder Patel also admitted today to periods of elevated mood and "sleeping very little" in the past     Sleep: improved  Appetite: slightly better  Medication side effects: No   ROS: no complaints, dizziness is resolved, denies any shortness of breath or chest pain    Mental Status Evaluation:    Appearance:  casually dressed   Behavior:  guarded   Speech:  soft   Mood:  less anxious, less dysphoric   Affect:  constricted   Thought Process:  concrete   Associations: concrete associations   Thought Content:  paranoid delusions, preoccupation with weight   Perceptual Disturbances: auditory hallucinations of "songs", no visual hallucinations   Risk Potential: Suicidal ideation - None  Homicidal ideation - None  Potential for aggression - No   Sensorium:  oriented to person, place and time/date   Memory:  recent and remote memory grossly intact   Consciousness:  alert and awake   Attention: attention span and concentration appear shorter than expected for age   Insight:  impaired   Judgment: impaired   Gait/Station: normal gait/station and normal balance   Motor Activity: no abnormal movements     Vital signs in last 24 hours:    Temp:  [97 8 °F (36 6 °C)-98 1 °F (36 7 °C)] 97 8 °F (36 6 °C)  HR:  [66-73] 66  Resp:  [16] 16  BP: (106-112)/(57-62) 112/62    Laboratory results:  I have personally reviewed all pertinent laboratory/tests results  Thyroid Studies:   Lab Results   Component Value Date    ENA1DHSVNQGH 7 310 (H) 10/27/2017    T3FREE 1 79 (L) 10/27/2017    FREET4 1 24 10/27/2017       Progress Toward Goals: limited improvement, still anxious, not suicidal, insight remains poor, still has psychotic symptoms    Assessment/Plan   Principal Problem:    Schizoaffective disorder, bipolar type ([facility] [address] ) - diagnosis updated  Active Problems:    BIANCA (generalized anxiety disorder)    Recommended Treatment:     Planned medication and treatment changes: All current active medications have been reviewed  Encourage group therapy, milieu therapy and occupational therapy  Behavioral Health checks every 15 minutes  Consult medical service for follow up for hypothyroidism  Continue current medications:      levothyroxine 100 mcg Oral Early Morning   loratadine 10 mg Oral Daily   lurasidone 80 mg Oral Daily With Dinner       Risks / Benefits of Treatment:    Risks, benefits, and possible side effects of medications explained to patient and patient verbalizes understanding and agreement for treatment  Risks of medications in pregnancy explained if female patient  Patient verbalizes understanding and agrees to notify her doctor if she becomes pregnant  Counseling / Coordination of Care: Total floor / unit time spent today 40 minutes  Greater than 50% of total time was spent with the patient and / or family counseling and / or coordination of care   A description of counseling / coordination of care:    Patient's progress discussed with staff in treatment team meeting  Medications, treatment progress and treatment plan reviewed with patient  Medication education provided to patient  Patient's diagnosis and treatment indicated reviewed with patient  Discussed with patient acceptance of mental illness diagnosis and need for ongoing treatment after discharge  Discussed with patient option of long acting injectable medications  She refuses any ling acting injectable medications  Reassurance and support provided

## 2017-10-27 NOTE — CASE MANAGEMENT
Pt has a bright affect, anticipating discharge, denies all symptoms at this point in time, very social with peers and out in the community  Pt states she will take the Rj Sandifer as ordered, including when she is discharged and that she looks forward to going to her outpt appts at [facility]

## 2017-10-27 NOTE — ASSESSMENT & PLAN NOTE
· TSH elevated at 7 310 with normal free T4 and slightly decreased T3 1 79  · Review of [facility] med rec indicates she has been compliant with taking levothyroxine and she admits to taking her meds at home on a regular basis  · Spoke with patients PCP office Dr Yandy López who advised to increase levothyroxine to 125 mcg daily  · With this change would recommend repeat Free T4/TSH in 6 weeks   · Recommend OP follow with her PCP for future monitoring and mgmt of hypothyroidism
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
## 2017-10-28 RX ADMIN — LEVOTHYROXINE SODIUM 112 MCG: 112 TABLET ORAL at 06:21

## 2017-10-28 RX ADMIN — LURASIDONE HYDROCHLORIDE 80 MG: 80 TABLET, FILM COATED ORAL at 18:00

## 2017-10-28 RX ADMIN — LORATADINE 10 MG: 10 TABLET ORAL at 09:14

## 2017-10-28 NOTE — PROGRESS NOTES
Pt has bright affect and is medication complaint  Pt has been seculsive to room  RN went to use language line w/ pt and pt was a sleep  PT did not express any concerns  Education print outs of hypothyroidism will be given to pt  Nutrition came to see pt earlier on shift

## 2017-10-28 NOTE — PROGRESS NOTES
Patient reports that she doesn't feel safe in this hospital  She reports hearing noises like people hitting the wall and scratching the door she attributes this to other patients  Denies AH  She reports being worried that she is overweight  She reports that her allergy medication make her feel dizzy and wants to stop taking it  Patient is cooperative with care  Pt is calm and pleasant

## 2017-10-28 NOTE — PROGRESS NOTES
Pt smiling and pleasant on approach  Denies SI/HI/AVH  Continues to report feeling afraid  Pt out of room for dinner then returned to room  States she is looking forward to visit from family this evening  Compliant with meds and care  Will monitor

## 2017-10-28 NOTE — PLAN OF CARE
Alteration in Thoughts and Perception     Treatment Goal: Gain control of psychotic behaviors/thinking, reduce/eliminate presenting symptoms and demonstrate improved reality functioning upon discharge Not Progressing     Recognize dysfunctional thoughts, communicate reality-based thoughts at the time of discharge Not Progressing        Patient lacks insight and has unaltered preoccupations    Alteration in Thoughts and Perception     Verbalize thoughts and feelings Progressing     Refrain from acting on delusional thinking/internal stimuli Progressing     Agree to be compliant with medication regime, as prescribed and report medication side effects Progressing     Complete daily ADLs, including personal hygiene independently, as able [address] Discharge to home or other facility with appropriate resources Progressing        Ineffective Coping     Identifies ineffective coping skills Progressing     Identifies healthy coping skills Progressing     Participates in unit activities Progressing     Participates in unit activities Progressing     Patient/Family participate in treatment and DC plans Progressing        Risk for Violence/Aggression Toward Others     Treatment Goal: Refrain from acts of violence/aggression during length of stay, and demonstrate improved impulse control at the time of discharge Progressing     Refrain from destructive acts on the environment or property Progressing

## 2017-10-28 NOTE — PROGRESS NOTES
Progress Note - [id number] Lucy Braden 32 y o  female MRN: [id number]   Unit/Bed#: GQ0 764-01 Encounter: [id number]    Interviewed with Language Line,  # [id number]    Behavior over the last 24 hours: slowly improving  Brayan Mchugh is doing slightly better  She still has auditory hallucinations of "songs, noises" and still has some paranoia, but her affect is brighter and she has been socializing a little more with peers  She is still focusing on her weight and although she has been eating better, she said she "could not sleep last night because my stomach was too full"  Has been taking medications  Sleep: decreased  Appetite: improving  Medication side effects: Yes - dizziness   ROS: reports dizziness, denies any shortness of breath or chest pain    Mental Status Evaluation:    Appearance:  casually dressed   Behavior:  less guarded   Speech:  soft   Mood:  less anxious, less dysphoric   Affect:  slightly brighter   Thought Process:  concrete   Associations: concrete associations   Thought Content:  paranoid ideation, preoccupied with her weight   Perceptual Disturbances: auditory hallucinations of "songs and noises", no visual hallucinations   Risk Potential: Suicidal ideation - None  Homicidal ideation - None  Potential for aggression - No   Sensorium:  oriented to person, place and time/date   Memory:  recent and remote memory grossly intact   Consciousness:  alert and awake   Attention: decreased concentration and decreased attention span   Insight:  impaired   Judgment: impaired   Gait/Station: normal gait/station and normal balance   Motor Activity: no abnormal movements     Vital signs in last 24 hours:    Temp:  [97 9 °F (36 6 °C)-98 5 °F (36 9 °C)] 97 9 °F (36 6 °C)  HR:  [64-77] 64  Resp:  [16] 16  BP: (121-127)/(55-69) 127/69    Laboratory results:  I have personally reviewed all pertinent laboratory/tests results      Progress Toward Goals: some progress, reality testing is slowly improving, less anxious    Assessment/Plan   Principal Problem:    Schizoaffective disorder, bipolar type (HCC)  Active Problems:    BIANCA (generalized anxiety disorder)    Schizoaffective disorder (HCC)    Hypothyroid    Recommended Treatment:     Planned medication and treatment changes: All current active medications have been reviewed  Encourage group therapy, milieu therapy and occupational therapy  Behavioral Health checks every 15 minutes  Appreciate medical input - Levothyroxine was increased  Discontinue Claritin - she thinks dizziness is related to Claritin    Continue all other medications:      levothyroxine 112 mcg Oral Early Morning   lurasidone 80 mg Oral Daily With Dinner       Risks / Benefits of Treatment:    Risks, benefits, and possible side effects of medications explained to patient and patient verbalizes understanding and agreement for treatment  Risks of medications in pregnancy explained if female patient  Patient verbalizes understanding and agrees to notify her doctor if she becomes pregnant  Counseling / Coordination of Care:    Patient's progress reviewed with nursing staff  Medications, treatment progress and treatment plan reviewed with patient

## 2017-10-29 RX ORDER — ARIPIPRAZOLE 10 MG/1
10 TABLET ORAL EVERY EVENING
Status: DISCONTINUED | OUTPATIENT
Start: 2017-10-29 | End: 2017-10-30

## 2017-10-29 RX ADMIN — LEVOTHYROXINE SODIUM 112 MCG: 112 TABLET ORAL at 05:53

## 2017-10-29 RX ADMIN — ARIPIPRAZOLE 10 MG: 10 TABLET ORAL at 18:47

## 2017-10-29 NOTE — PROGRESS NOTES
Progress Note - [id number] Lucy Braden 32 y o  female MRN: [id number]   Unit/Bed#: YE0 764-01 Encounter: [id number]    Interviewed with [facility],  # [id number]    Behavior over the last 24 hours: clarence Tavares is complaining of "not feeling right in my head like I am floating" today - thinks it is due to Dala Peaches and does not want to continue Latuda anymore  She is also still focusing on her weight; paranoid and fearful  Also noted to have some irritability today - preoccupied with discharge  Insight remains poor  Attends only some groups  Sleep: decreased  Appetite: improved  Medication side effects: Yes - dizziness   ROS: reports dizziness, denies any shortness of breath or chest pain    Mental Status Evaluation:    Appearance:  casually dressed   Behavior:  guarded   Speech:  soft   Mood:  dysphoric, anxious   Affect:  constricted   Thought Process:  concrete   Associations: concrete associations   Thought Content:  paranoid ideation, preoccupied with weight   Perceptual Disturbances: auditory hallucinations of "noises" and of "songs and people in long distance", no visual hallucinations   Risk Potential: Suicidal ideation - None  Homicidal ideation - None  Potential for aggression - No   Sensorium:  oriented to person, place and time/date   Memory:  recent and remote memory grossly intact   Consciousness:  alert and awake   Attention: decreased concentration and decreased attention span   Insight:  impaired   Judgment: impaired   Gait/Station: normal gait/station and normal balance   Motor Activity: no abnormal movements     Vital signs in last 24 hours:    Temp:  [98 °F (36 7 °C)-98 2 °F (36 8 °C)] 98 °F (36 7 °C)  HR:  [65-66] 65  Resp:  [16] 16  BP: (114-117)/(77-78) 114/77    Laboratory results:  I have personally reviewed all pertinent laboratory/tests results      Progress Toward Goals: no improvement, still anxious, still paranoid, insight remains poor    Assessment/Plan Principal Problem:    Schizoaffective disorder, bipolar type ([facility] )  Active Problems:    BIANCA (generalized anxiety disorder)    Schizoaffective disorder ([facility] )    Hypothyroid    Recommended Treatment:     Planned medication and treatment changes: All current active medications have been reviewed  Encourage group therapy, milieu therapy and occupational therapy  [address] checks every 15 minutes  Discontinue Latuda - she does not want to take [employer] Abilify 10 mg in the evening and titrate dose    Continue all other medications:      ARIPiprazole 10 mg Oral QPM   levothyroxine 112 mcg Oral Early Morning       Risks / Benefits of Treatment:    Risks, benefits, and possible side effects of medications explained to patient and patient verbalizes understanding and agreement for treatment  Risks of medications in pregnancy explained if female patient  Patient verbalizes understanding and agrees to notify her doctor if she becomes pregnant  Counseling / Coordination of Care:    Patient's progress reviewed with nursing staff  Medications, treatment progress and treatment plan reviewed with patient  Medication changes discussed with patient  Importance of medication and treatment compliance reviewed with patient

## 2017-10-29 NOTE — PROGRESS NOTES
RN spoke with patient via [address] [facility]  #[id number]  Patient complains of throat pain 7/10 denies pain when swallowing  Palpation revealed swelling bilaterally around larynx, patient states she is tender to palpation  Patient denies any other physical or mental symptoms  Patient is preoccupied with her weight, states she has gained 20 lbs over 4 years  Patient denies having AH and VH at any point  Patients required frequent repetition and rephrasing of questions and often continued to give inappropriate responses  Patients speech was pressured  Will continue to monitor

## 2017-10-29 NOTE — PROGRESS NOTES
Pt mainly seclusive to room  Denies SI and AVH  Appears preoccupied  No complaints of pain  Compliant with medication and care  Will monitor

## 2017-10-29 NOTE — PLAN OF CARE
Alteration in Thoughts and Perception     Treatment Goal: Gain control of psychotic behaviors/thinking, reduce/eliminate presenting symptoms and demonstrate improved reality functioning upon discharge Not Progressing     Refrain from acting on delusional thinking/internal stimuli Not Progressing     Recognize dysfunctional thoughts, communicate reality-based thoughts at the time of discharge Not Progressing        Ineffective Coping     Identifies ineffective coping skills Not Progressing     Identifies healthy coping skills Not Progressing        Patient is disorganized, lacks insight into dysfunctional thinking    Alteration in Thoughts and Perception     Verbalize thoughts and feelings Progressing     Agree to be compliant with medication regime, as prescribed and report medication side effects Progressing     Complete daily ADLs, including personal hygiene independently, as able [address] Discharge to home or other facility with appropriate resources Progressing        Ineffective Coping     Participates in unit activities Progressing     Participates in unit activities Progressing     Patient/Family participate in treatment and DC plans Progressing        Risk for Violence/Aggression Toward Others     Treatment Goal: Refrain from acts of violence/aggression during length of stay, and demonstrate improved impulse control at the time of discharge Progressing     Refrain from destructive acts on the environment or property Progressing

## 2017-10-30 RX ORDER — ARIPIPRAZOLE 15 MG/1
15 TABLET ORAL EVERY EVENING
Status: DISCONTINUED | OUTPATIENT
Start: 2017-10-30 | End: 2017-11-01

## 2017-10-30 RX ADMIN — LEVOTHYROXINE SODIUM 112 MCG: 112 TABLET ORAL at 06:26

## 2017-10-30 RX ADMIN — ARIPIPRAZOLE 15 MG: 15 TABLET ORAL at 19:03

## 2017-10-30 NOTE — CASE MANAGEMENT
CM called [facility] DEPENDENC[facility] and left message with Elena Ferraro regarding the ICM referral  Phone [phone]  CM called [facility] and left message with therapist Violetta Villalta to cancel the intake appointment which is scheduled November 1st  CM asked for return call in order for appointment to be rescheduled  Phone [phone]

## 2017-10-30 NOTE — PLAN OF CARE
Problem: DISCHARGE PLANNING  Goal: Discharge to home or other facility with appropriate resources  INTERVENTIONS:  - Identify barriers to discharge w/patient and caregiver  - Arrange for needed discharge resources and transportation as appropriate  - Identify discharge learning needs (meds, wound care, etc )  - Arrange for interpretive services to assist at discharge as needed  - Refer to Case Management Department for coordinating discharge planning if the patient needs post-hospital services based on physician/advanced practitioner order or complex needs related to functional status, cognitive ability, or social support system   Outcome: Progressing      Problem: Ineffective Coping  Goal: Identifies ineffective coping skills  Outcome: Progressing    Goal: Identifies healthy coping skills  Outcome: Progressing    Goal: Participates in unit activities  Interventions:  - Provide therapeutic environment   - Provide required programming   - Redirect inappropriate behaviors    Outcome: Not Progressing    Goal: Participates in unit activities  Interventions:  - Provide therapeutic environment   - Provide required programming   - Redirect inappropriate behaviors    Outcome: Not Progressing    Goal: Patient/Family participate in treatment and DC plans  Interventions:  - Provide therapeutic environment   Outcome: Progressing      Problem: Alteration in Thoughts and Perception  Goal: Treatment Goal: Gain control of psychotic behaviors/thinking, reduce/eliminate presenting symptoms and demonstrate improved reality functioning upon discharge  Outcome: Not Progressing    Goal: Verbalize thoughts and feelings  Interventions:  - Promote a nonjudgmental and trusting relationship with the patient through active listening and therapeutic communication  - Assess patient's level of functioning, behavior and potential for risk  - Engage patient in 1 on 1 interactions for a minimum of 15 minutes each session  - Encourage patient to express fears, feelings, frustrations, and discuss symptoms    - [place] patient to reality, help patient recognize reality-based thinking   - Administer medications as ordered and assess for potential side effects  - Provide the patient education related to the signs and symptoms of the illness and desired effects of prescribed medications   Outcome: Progressing    Goal: Refrain from acting on delusional thinking/internal stimuli  Interventions:  - Monitor patient closely, per order   - Utilize least restrictive measures   - Set reasonable limits, give positive feedback for acceptable   - Administer medications as ordered and monitor of potential side effects   Outcome: Progressing    Goal: Agree to be compliant with medication regime, as prescribed and report medication side effects  Interventions:  - Offer appropriate PRN medication and supervise ingestion; conduct aims, as needed    Outcome: Progressing    Goal: Recognize dysfunctional thoughts, communicate reality-based thoughts at the time of discharge  Interventions:  - Provide medication and psycho-education to assist patient in compliance and developing insight into his/her illness    Outcome: Not Progressing    Goal: Complete daily ADLs, including personal hygiene independently, as able  Interventions:  - Observe, teach, and assist patient with ADLS  - Monitor and promote a balance of rest/activity, with adequate nutrition and elimination    Outcome: Progressing      Problem: Risk for Violence/Aggression Toward Others  Goal: Treatment Goal: Refrain from acts of violence/aggression during length of stay, and demonstrate improved impulse control at the time of discharge  Outcome: Progressing    Goal: Refrain from destructive acts on the environment or property  Outcome: Progressing

## 2017-10-30 NOTE — PROGRESS NOTES
Pt mainly seclusive to room  Reported being scared because she hears people fighting in the [place]  Stating she wanted to go home  Pt offered to move closer to NS and accepted offer  Moved into obs room  Will monitor

## 2017-10-30 NOTE — PROGRESS NOTES
Progress Note - [id number] Lucy Braden 32 y o  female MRN: [id number]   Unit/Bed#: AU8 764-01 Encounter: [id number]    Interviewed with Language Line,  # [id number]    Behavior over the last 24 hours: unchanged  Kiya Font remains guarded and preoccupied, still seems paranoid and distracted  Still reports auditory hallucinations "when I am in my room by myself I hear voices like radio, songs, voices of an artist or voices from when I was at school"  Also reports visual hallucinations of "images and silhouettes of people", states she feels "like they are getting close to me"  Seclusive to the room  Minimal participation in milieu  Compliant with medications - states dizziness has resolved on Abilify      Sleep: slept better  Appetite: decreased  Medication side effects: No - dizziness has resolved   ROS: no complaints, dizziness is resolved, denies any shortness of breath or chest pain    Mental Status Evaluation:    Appearance:  casually dressed   Behavior:  cooperative, guarded   Speech:  soft, tangential   Mood:  dysphoric, anxious   Affect:  constricted   Thought Process:  tangential, concrete   Associations: concrete associations   Thought Content:  paranoid ideation, preoccupied with weight   Perceptual Disturbances: auditory hallucinations of "radio, songs, voices of an artist or voices from when I was at school", visual hallucinations of images and silhouettes of people"   Risk Potential: Suicidal ideation - None  Homicidal ideation - None  Potential for aggression - No   Sensorium:  oriented to person, place and time/date   Memory:  recent and remote memory grossly intact   Consciousness:  alert and awake   Attention: decreased concentration and decreased attention span   Insight:  impaired   Judgment: impaired   Gait/Station: normal gait/station and normal balance   Motor Activity: no abnormal movements     Vital signs in last 24 hours:    Temp:  [98 1 °F (36 7 °C)-98 5 °F (36 9 °C)] 98 1 °F (36 7 °C)  HR:  [] 102  Resp:  [16] 16  BP: (130-131)/(64-81) 131/81    Laboratory results:  I have personally reviewed all pertinent laboratory/tests results  Progress Toward Goals: no progress, poor insight, reality testing remains poor, still has psychotic symptoms    Assessment/Plan   Principal Problem:    Schizoaffective disorder, bipolar type ([facility])  Active Problems:    BIANCA (generalized anxiety disorder)    Schizoaffective disorder ([facility] [address] )    Hypothyroid    Recommended Treatment:     Planned medication and treatment changes: All current active medications have been reviewed  Encourage group therapy, milieu therapy and occupational therapy  Behavioral Health checks every 15 minutes  Increase Abilify to 15 mg at bedtime  She refuses a mood stabilizer at present    Continue all other medications:      ARIPiprazole 15 mg Oral QPM   levothyroxine 112 mcg Oral Early Morning       Risks / Benefits of Treatment:    Risks, benefits, and possible side effects of medications explained to patient and patient verbalizes understanding and agreement for treatment  Risks of medications in pregnancy explained if female patient  Patient verbalizes understanding and agrees to notify her doctor if she becomes pregnant  Counseling / Coordination of Care:    Patient's progress discussed with staff in treatment team meeting  Medications, treatment progress and treatment plan reviewed with patient  Medication changes discussed with patient

## 2017-10-30 NOTE — PROGRESS NOTES
Spoke with patient via language line, ID # [id number]  Patient a bit more visible; attended group  Affect appears depressed  Decrease in paranoid thinking

## 2017-10-30 NOTE — CASE MANAGEMENT
ANABELLA called [facility] and spoke to [place] city regarding the ICM referral  [place] city said the case has been assigned to [facility] and she should be calling CM today or tomorrow with a day and time she can come to the hospital to meet with [facility]  ANABELLA called Ngoc's sister Kaden [place] and updated  She told ANABELLA her parents were going to visit with [facility] on Saturday but Ngoc called them and asked them not to come in because she was not feeling well

## 2017-10-31 RX ADMIN — LEVOTHYROXINE SODIUM 112 MCG: 112 TABLET ORAL at 05:54

## 2017-10-31 RX ADMIN — ARIPIPRAZOLE 15 MG: 15 TABLET ORAL at 19:02

## 2017-10-31 NOTE — PROGRESS NOTES
Progress Note - [id number] Lucy Braden 32 y o  female MRN: [id number]   Unit/Bed#: JG7 764-01 Encounter: [id number]    Interviewed with Language Line,  # [id number]    Behavior over the last 24 hours: minimal improvement  Ursula Reich was paranoid and fearful last night - reported hearing "people fighting in the [place]", but per staff report there was nobody fighting on the unit  She still seems anxious today and is still focusing on medications - now states her Levothyroxine causes nausea  Denies any problems related to Abilify  Attends groups more often  Sleep: improved  Appetite: improving  Medication side effects: Yes - nausea   ROS: reports nausea, denies any shortness of breath or chest pain    Mental Status Evaluation:    Appearance:  casually dressed   Behavior:  cooperative   Speech:  soft, tangential   Mood:  dysphoric, anxious   Affect:  constricted   Thought Process:  tangential, concrete   Associations: concrete associations   Thought Content:  paranoid delusions   Perceptual Disturbances: auditory hallucinations of "people fighting", visual hallucinations still present, but less frequent   Risk Potential: Suicidal ideation - None  Homicidal ideation - None  Potential for aggression - No   Sensorium:  oriented to person, place and time/date   Memory:  recent and remote memory grossly intact   Consciousness:  alert and awake   Attention: decreased concentration and decreased attention span   Insight:  impaired   Judgment: impaired   Gait/Station: normal gait/station and normal balance   Motor Activity: no abnormal movements     Vital signs in last 24 hours:    Temp:  [97 7 °F (36 5 °C)-98 3 °F (36 8 °C)] 97 7 °F (36 5 °C)  HR:  [71-87] 87  Resp:  [16] 16  BP: (108-130)/(63-71) 130/71    Laboratory results:  I have personally reviewed all pertinent laboratory/tests results      Progress Toward Goals: minimal progress, insight remains poor, still has psychotic symptoms    Assessment/Plan Principal Problem:    Schizoaffective disorder, bipolar type ([facility] )  Active Problems:    BIANCA (generalized anxiety disorder)    Schizoaffective disorder ([facility] )    Hypothyroid    Recommended Treatment:     Planned medication and treatment changes: All current active medications have been reviewed  Encourage group therapy, milieu therapy and occupational therapy  Behavioral Health checks every 15 minutes  Neuropsychological testing started to help with diagnosis  Reconsult medical service as she is complaining of nausea due to Levothyroxine    Continue current medications:      ARIPiprazole 15 mg Oral QPM   levothyroxine 112 mcg Oral Early Morning       Risks / Benefits of Treatment:    Risks, benefits, and possible side effects of medications explained to patient and patient verbalizes understanding and agreement for treatment  Risks of medications in pregnancy explained if female patient  Patient verbalizes understanding and agrees to notify her doctor if she becomes pregnant  Counseling / Coordination of Care:    Patient's progress discussed with staff in treatment team meeting  Medications, treatment progress and treatment plan reviewed with patient  Medication changes discussed with patient

## 2017-10-31 NOTE — PROGRESS NOTES
Pt visiting with family at this time but otherwise seclusive to room  Affect improved  Pt denies AVH and has not reported being scared this evening  Her room has been changed as she requested a larger room  Compliant with meds and care

## 2017-10-31 NOTE — PROGRESS NOTES
Spoke with patient via language line, ID # [id number]  Patient appears a bit less paranoid during interaction  Affect slightly improved and smiling during interaction  Compliant with medications and is questioning when she will be discharged

## 2017-10-31 NOTE — PLAN OF CARE
Problem: DISCHARGE PLANNING  Goal: Discharge to home or other facility with appropriate resources  INTERVENTIONS:  - Identify barriers to discharge w/patient and caregiver  - Arrange for needed discharge resources and transportation as appropriate  - Identify discharge learning needs (meds, wound care, etc )  - Arrange for interpretive services to assist at discharge as needed  - Refer to Case Management Department for coordinating discharge planning if the patient needs post-hospital services based on physician/advanced practitioner order or complex needs related to functional status, cognitive ability, or social support system   Outcome: Progressing      Problem: Ineffective Coping  Goal: Identifies ineffective coping skills  Outcome: Not Progressing    Goal: Identifies healthy coping skills  Outcome: Progressing    Goal: Participates in unit activities  Interventions:  - Provide therapeutic environment   - Provide required programming   - Redirect inappropriate behaviors    Outcome: Not Progressing    Goal: Participates in unit activities  Interventions:  - Provide therapeutic environment   - Provide required programming   - Redirect inappropriate behaviors    Outcome: Not Progressing    Goal: Patient/Family participate in treatment and DC plans  Interventions:  - Provide therapeutic environment   Outcome: Progressing      Problem: Alteration in Thoughts and Perception  Goal: Treatment Goal: Gain control of psychotic behaviors/thinking, reduce/eliminate presenting symptoms and demonstrate improved reality functioning upon discharge  Outcome: Progressing    Goal: Verbalize thoughts and feelings  Interventions:  - Promote a nonjudgmental and trusting relationship with the patient through active listening and therapeutic communication  - Assess patient's level of functioning, behavior and potential for risk  - Engage patient in 1 on 1 interactions for a minimum of 15 minutes each session  - Encourage patient to express fears, feelings, frustrations, and discuss symptoms    - [place] patient to reality, help patient recognize reality-based thinking   - Administer medications as ordered and assess for potential side effects  - Provide the patient education related to the signs and symptoms of the illness and desired effects of prescribed medications   Outcome: Progressing    Goal: Refrain from acting on delusional thinking/internal stimuli  Interventions:  - Monitor patient closely, per order   - Utilize least restrictive measures   - Set reasonable limits, give positive feedback for acceptable   - Administer medications as ordered and monitor of potential side effects   Outcome: Progressing    Goal: Agree to be compliant with medication regime, as prescribed and report medication side effects  Interventions:  - Offer appropriate PRN medication and supervise ingestion; conduct aims, as needed    Outcome: Progressing    Goal: Recognize dysfunctional thoughts, communicate reality-based thoughts at the time of discharge  Interventions:  - Provide medication and psycho-education to assist patient in compliance and developing insight into his/her illness    Outcome: Not Progressing    Goal: Complete daily ADLs, including personal hygiene independently, as able  Interventions:  - Observe, teach, and assist patient with ADLS  - Monitor and promote a balance of rest/activity, with adequate nutrition and elimination    Outcome: Progressing      Problem: Risk for Violence/Aggression Toward Others  Goal: Treatment Goal: Refrain from acts of violence/aggression during length of stay, and demonstrate improved impulse control at the time of discharge  Outcome: Progressing    Goal: Refrain from destructive acts on the environment or property  Outcome: Progressing

## 2017-10-31 NOTE — CONSULTS
Neuropsychological Evaluation      Name:  Therese Jansen   : [birth year]  C  A : 27yrs, 8 months   Referral Source: Dr Fabian Lopez      Occupation:  Unemployed             Examiner: Dr Que Cagle             Education:  12th Grade           Marital Status:  Single      Presenting Problem:     Patient is being referred for a [place] language Neuropsychological Evaluation      Past Medical History:     Schizoaffective Disorder - Bipolar Type, Atopic Rhinitis, Disease of Thyroid Gland, Generalized Anxiety Disorder, Hypothyroidism, Head Injury    Current Medications     See Patient History      Behavioral Observations:     Patient demonstrated no abnormal movements, was normal in her speech (rate, rhythm, volume), and was capable of participating fully in her evaluation  She is a 33 y/o, SHF, who was alert, oriented in all areas, with no apparent abnormalities of thought or behavior noted  Reports that she had seen a psychiatrist in the past in [place] [place] [facility]"" for a consultation  She denies current hallucinations, delusions, other misperceptions of reality  However, she reports fearfulness over loud sounds in her room and at home, with fear of harm intruding into her ability to sleep  The patient resides with her family  The patient was evaluated in  [facility]  Findings:     The patient demonstrated some mild areas of strength in Naming, Auditory Narrative Memory, and Visual-Spatial Construction  However, she demonstrates a pattern of Borderline Functioning in most cognitive areas    She received the following cognitive levels:      General Cognitive Functioning     Grawn Cognitive Assessment (MOCA) - Impaired Levels    Attention/Concentration:     Immediate Attention - Low Average Levels   Sustained Attention - Borderline Functioning   Processing Speed - Borderline    Executive Functioning:     Cognitive Flexibility/Control - Borderline Functioning  Generative Ability - Verbal - Borderline Functioning  Working Memory - Low Average     Abstract Reasoning - Borderline Functioning    Language Functioning:     Naming - Average  Vocabulary - Borderline  Verbal Comprehension - Impaired    Memory:     Auditory Narrative Memory:      Short Delay - Low Average  Long Delay - Low Average     Auditory Verbal List Memory:      Encoding - Impaired  Short Delay - Borderline  Long Delay - Borderline Functioning     Visual Recognition - Borderline Functioning    Visual Spatial:     Construction - Average    Functional:     Health & Safety - Low   Financial Awareness/Mgmt - Low    Mood:     Depression - Mild  Anxiety - Mild      Conclusions:     As can be seen the patient has cognitive challenges negotiating her environment and may be why she remains dependent upon her family to negotiate her environment  It is recommended that she be seen in OP Treatment to address her difficulties with anxiety and in her thinking      Diagnostic Impression:      (1) F25 0 Schizoaffective Disorder (Bipolar Type)    (2) F41 1 Generalized Anxiety Disorder    (3) G 31 84 Mild Cognitive Impairment        Jose A Boucher, Ph D    Psychologist

## 2017-11-01 RX ORDER — ARIPIPRAZOLE 10 MG/1
20 TABLET ORAL EVERY EVENING
Status: DISCONTINUED | OUTPATIENT
Start: 2017-11-01 | End: 2017-11-04

## 2017-11-01 RX ORDER — ONDANSETRON 4 MG/1
4 TABLET, ORALLY DISINTEGRATING ORAL EVERY 6 HOURS PRN
Status: DISCONTINUED | OUTPATIENT
Start: 2017-11-01 | End: 2017-11-06 | Stop reason: HOSPADM

## 2017-11-01 RX ADMIN — ARIPIPRAZOLE 20 MG: 10 TABLET ORAL at 17:54

## 2017-11-01 RX ADMIN — LEVOTHYROXINE SODIUM 112 MCG: 112 TABLET ORAL at 06:18

## 2017-11-01 NOTE — PROGRESS NOTES
Pt asked to be changed to another room because she stated "  My room mate is sick and I do not want to get her illness  She has the flu "  Pt was ed that her room mate does not have the flu but refused to stay in that room  Pt was moved into another room which suited her fine  Pt denies SI and denies hearing voices when she is in her room but hears voices as she is walking the halls and when she is eating dinner with other patients and requested to eat dinner alone

## 2017-11-01 NOTE — PROGRESS NOTES
Progress Note - 11326 Lucy Braden 32 y o  female MRN: [id number]   Unit/Bed#: KD5 776-23 Encounter: [id number]    Interviewed with Jake Puga,  # [id number] and [id number]    Behavior over the last 24 hours: unchanged  Naila Harmonn states she feels "down" today, reports having low energy and low motivation  Still seclusive to her room, has not been going to groups  States she heard "a lot of noises last night like there was a physical fight" when she was reading the Bible, felt "scared"  Compliant with medications  Sleep: normal  Appetite: improved  Medication side effects: No - nausea has resolved   ROS: no complaints, denies any shortness of breath, chest pain or nausea    Mental Status Evaluation:    Appearance:  casually dressed   Behavior:  cooperative   Speech:  soft   Mood:  dysphoric, anxious   Affect:  blunted   Thought Process:  concrete   Associations: concrete associations   Thought Content:  paranoid delusions   Perceptual Disturbances: auditory hallucinations of "noises of people fighting"   Risk Potential: Suicidal ideation - None  Homicidal ideation - None  Potential for aggression - No   Sensorium:  oriented to person, place and time/date   Memory:  recent and remote memory grossly intact   Consciousness:  alert and awake   Attention: decreased concentration and decreased attention span   Insight:  impaired   Judgment: impaired   Gait/Station: normal gait/station and normal balance   Motor Activity: no abnormal movements     Vital signs in last 24 hours:     Temp:  [97 5 °F (36 4 °C)-98 1 °F (36 7 °C)] 98 1 °F (36 7 °C)  HR:  [63-78] 78  Resp:  [16] 16  BP: (110-111)/(64-71) 111/71    Laboratory results:  I have personally reviewed all pertinent laboratory/tests results      Progress Toward Goals: no significant improvement, still paranoid, reality testing remains poor    Assessment/Plan   Principal Problem:    Schizoaffective disorder, bipolar type ([facility])  Active Problems:    BIANCA (generalized anxiety disorder)    Schizoaffective disorder ([facility] [address] )    Hypothyroid    Recommended Treatment:     Planned medication and treatment changes: All current active medications have been reviewed  Encourage group therapy, milieu therapy and occupational therapy  Behavioral Health checks every 15 minutes  Refuses addition of mood stabilizer  Increase Abilify to 20 mg at bedtime    Continue all other medications:       ARIPiprazole 15 mg Oral QPM   levothyroxine 112 mcg Oral Early Morning       Risks / Benefits of Treatment:    Risks, benefits, and possible side effects of medications explained to patient and patient verbalizes understanding and agreement for treatment  Risks of medications in pregnancy explained if female patient  Patient verbalizes understanding and agrees to notify her doctor if she becomes pregnant  Counseling / Coordination of Care: Total floor / unit time spent today 35 minutes  Greater than 50% of total time was spent with the patient and / or family counseling and / or coordination of care  A description of counseling / coordination of care: Ngoc's progress discussed with staff in treatment team meeting  Medications, treatment progress and treatment plan reviewed with Ngoc  Medication changes discussed with Ngoc  Reviewed with [place] results of psychological testing including diagnosis of Schizoaffective disorder, bipolar type  I reviewed with her addition of a mood stabilizer like Lamictal to help with her mood symptoms  She refuses any additional medication trials  I educated her again on benefits of better control of her psychiatric symptoms, but she continued to refuse any additional treatment options, despite confirmation of Schizoaffective disorder diagnosis  Reassurance and support provided

## 2017-11-01 NOTE — DISCHARGE INSTRUCTIONS
Lorazepam (Por la boca)   Se [place] para el tratamiento de la ansiedad  Cyril(s) : Ativan, LORazepam Intensol   Existen muchas otras marcas de Cristina  Tory medicamento no debe ser usado cuando:   Tory medicamento no es adecuado para todas las personas  No lo use si usted ha tenido lei reacción alérgica al lorazepam o a otros medicamentos similares o si está embarazada o amamantando o ha tenido glaucoma de ángulo cerrado ken  Forma de usar tory medicamento:   Líquido, Tableta  · [place] deborah medicamentos jeremiah se le haya indicado  Es probable que sea necesario cambiar brenner dosis varias veces hasta encontrar la que funciona mejor para usted  · Solución oral:   ¨ Mida el líquido oral con Donna [place], jeringa para uso oral o taza especialmente marcadas para medir medicamentos  ¨ Mezcle el medicamento con agua, jugo, soda, puré de [place] o pudín  [place] o coma la mezcla enseguida  No la guarde para tomarla después  · Cristina debe venir con Catha [place] Guía del medicamento  Solicite lei copia con brenner farmacéutico en iván de no tener la guía  · Si olvida lei dosis: [place] la dosis tan pronto jeremiah lo recuerde  Si ha pasado más de lei hora del tiempo en que debía micky tomado, omita jude dosis y espere hasta que sea hora de jimbo la siguiente dosis  No use medicina adicional para compensar lei dosis [place]  ·   ¨ Solución oral: Refrigere la solución oral  Bote la botella abierta después de 90 días  ¨ Tabletas: Guarde el medicamento en un recipiente cerrado a temperatura ambiente y alejado del calor, la humedad y la afshan directa  Medicamentos y [employer] que debe evitar:   Consulte con brenner médico o farmacéutico antes de usar cualquier medicamento, incluyendo los que compra sin receta médica, las vitaminas y los productos herbales    · Algunos medicamento podrían afectar la función del lorazepam  Informe a brenner médico si está usando cualquiera de los siguientes:   ¨ Aminofilina, clozapina, probenecida, teofilina, valproato  ¨ Medicamentos para tratar la depresión o enfermedades mentales  ¨ Medicamentos para tratar las convulsiones  · No consuma alcohol mientras esté usando "[employer]  · Informe a brenner médico si usted [place] cualquier cosa que le provoca sueño  Precious Ogles son medicamentos para alergia o medicamentos narcóticos para el dolor y el alcohol  Precauciones aditi el uso de tory medicamento:   · No es seguro jimbo tory medicamento aditi el [facility]  Podría dañar al feto  Dígale a brenner médico de inmediato si usted [place]  · Informe a brenner médico si usted tiene enfermedad renal, enfermedad hepática, problemas pulmonares o respiratorios (por ejemplo, EPOC, apnea del sueño) o antecedentes de abuso de drogas o alcohol, depresión o convulsiones  · Tory medicamento puede convertirse en un hábito  No use más de la dosis prescrita  Llame a brenner médico si usted siente que el medicamento no le está funcionando  · No suspenda el uso de tory medicamento súbitamente  Brenner médico necesitará disminuir brenner dosis poco a poco antes de suspender el medicamento por completo  · Tory medicamento puede causar somnolencia  No maneje un vehículo ni eder ninguna tarea que pueda ser peligrosa hasta que usted sepa cómo lo afecta tory medicamento  · El médico solicitará exámenes de laboratorio aditi las citas de rutina para revisar los efectos de "[employer]  Asista a todas deborah citas  · Guarde todos los medicamentos fuera del alcance de los niños  Nunca comparta deborah medicamentos con [employer]    Efectos secundarios que pueden presentarse aditi el uso de tory medicamento:   Consulte inmediatamente con el médico si nota cualquiera de estos efectos secundarios:  · Reacción alérgica: Comezón o ronchas, hinchazón del cassy o las tushar, hinchazón u hormigueo en la boca o garganta, opresión en el pecho, dificultad para respirar  · Confusión, comportamientos o estado de ánimo inusuales, pensamientos de querer [employer] daño a sí mismo  · Convulsiones  · Sueño severo o debilidad, ritmo cardíaco lento, dificultad al respirar  · Empeoramiento de la depresión  Consulte con el médico si nota los siguientes efectos secundarios menos graves:   · Beola Gearing  Consulte con el médico si nota otros efectos secundarios que alexus son causados por massimo medicamento  Llame a barnes médico para consultarle [place]  Usted puede notificar brittnee efectos secundarios al FDA al [phone]  © 2017 2600 Fredi Brito Information is for End User's use only and may not be sold, redistributed or otherwise used for commercial purposes  Esta información es sólo para uso en educación  Barnes intención no es darle un consejo médico sobre enfermedades o tratamientos  Colsulte con barnes Su[place] farmacéutico antes de seguir cualquier régimen médico para saber si es seguro y efectivo para usted  Hipotiroidismo   LO QUE NECESITA SABER:   El hipotiroidismo es lei condición que se desarrolla cuando la glándula tiroides no produce suficiente hormona tiroidea  Las hormonas de la tiroides ayudan a controlar la temperatura del cuerpo, el ritmo cardíaco, el crecimiento, y peso corporal   Turner Qualia:   Llame al 911 en iván de presentar lo siguiente:   · Usted tiene dolor de pecho repentino o falta de aire  · Usted sufre lei convulsión  · Usted siente que se va a desmayar  Busque atención médica de inmediato si:   · Usted tiene diarrea, temblores o dificultad para dormir  · Brittnee piernas, tobillos o pies están inflamados  Pregúntele a barnes Renee Dust vitaminas y minerales son adecuados para usted  · Usted tiene fiebre  · Usted tiene escalofríos, tos o se siente débil y adolorido  · Usted tiene dolor e inflamación en los músculos y articulaciones  · Usted tiene comezón en la piel, inflamación o un sarpullido      · Brittnee signos y síntomas regresan o [place] después del tratamiento  · Usted tiene preguntas o inquietudes acerca de brenner condición o cuidado  Medicamentos:   · El reemplazo de la hormona tiroidea  ayuda a regresar el nivel de la hormona tiroidea a brenner normalidad  · [place] deborah medicamentos jeremiah se le haya indicado  Consulte con brenner médico si usted alexus que brenner medicamento no le está ayudando o si presenta efectos secundarios  Infórmele si es alérgico a cualquier medicamento  Mantenga lei lista actualizada de los [place], las vitaminas y los productos herbales que chayo  Incluya los siguientes datos de los medicamentos: cantidad, frecuencia y motivo de administración  Traiga con usted la lista o los envases de la píldoras a deborah citas de seguimiento  Lleve la lista de los medicamentos con usted en iván de lei emergencia  Acuda a deborah consultas de control con brenner médico según le indicaron  Es probable que tenga que regresar para realizarse más exámenes de manoj y controlar deborah niveles de la hormona tiroides  Los exámenes mostrarán si usted está recibiendo la cantidad correcta de medicamentos para la tiroides  Anote deborah preguntas para que se acuerde de hacerlas aditi deborah visitas  © 2017 2600 Fredi  Information is for End User's use only and may not be sold, redistributed or otherwise used for commercial purposes  All illustrations and images included in CareNotes® are the copyrighted property of A D A M , Inc  or [facility]   Esta información es sólo para uso en educación  Brenner intención no es darle un consejo médico sobre enfermedades o tratamientos  Colsulte con brenner [place] farmacéutico antes de seguir cualquier régimen médico para saber si es seguro y efectivo para usted

## 2017-11-01 NOTE — PLAN OF CARE
Problem: DISCHARGE PLANNING  Goal: Discharge to home or other facility with appropriate resources  INTERVENTIONS:  - Identify barriers to discharge w/patient and caregiver  - Arrange for needed discharge resources and transportation as appropriate  - Identify discharge learning needs (meds, wound care, etc )  - Arrange for interpretive services to assist at discharge as needed  - Refer to Case Management Department for coordinating discharge planning if the patient needs post-hospital services based on physician/advanced practitioner order or complex needs related to functional status, cognitive ability, or social support system   Outcome: Progressing      Problem: Ineffective Coping  Goal: Identifies ineffective coping skills  Outcome: Progressing    Goal: Identifies healthy coping skills  Outcome: Progressing    Goal: Participates in unit activities  Interventions:  - Provide therapeutic environment   - Provide required programming   - Redirect inappropriate behaviors    Outcome: Progressing    Goal: Participates in unit activities  Interventions:  - Provide therapeutic environment   - Provide required programming   - Redirect inappropriate behaviors    Outcome: Progressing    Goal: Patient/Family participate in treatment and DC plans  Interventions:  - Provide therapeutic environment   Outcome: Progressing      Problem: Alteration in Thoughts and Perception  Goal: Treatment Goal: Gain control of psychotic behaviors/thinking, reduce/eliminate presenting symptoms and demonstrate improved reality functioning upon discharge  Outcome: Progressing    Goal: Verbalize thoughts and feelings  Interventions:  - Promote a nonjudgmental and trusting relationship with the patient through active listening and therapeutic communication  - Assess patient's level of functioning, behavior and potential for risk  - Engage patient in 1 on 1 interactions for a minimum of 15 minutes each session  - Encourage patient to express fears, feelings, frustrations, and discuss symptoms    - [place] patient to reality, help patient recognize reality-based thinking   - Administer medications as ordered and assess for potential side effects  - Provide the patient education related to the signs and symptoms of the illness and desired effects of prescribed medications   Outcome: Progressing    Goal: Refrain from acting on delusional thinking/internal stimuli  Interventions:  - Monitor patient closely, per order   - Utilize least restrictive measures   - Set reasonable limits, give positive feedback for acceptable   - Administer medications as ordered and monitor of potential side effects   Outcome: Progressing    Goal: Agree to be compliant with medication regime, as prescribed and report medication side effects  Interventions:  - Offer appropriate PRN medication and supervise ingestion; conduct aims, as needed    Outcome: Progressing    Goal: Recognize dysfunctional thoughts, communicate reality-based thoughts at the time of discharge  Interventions:  - Provide medication and psycho-education to assist patient in compliance and developing insight into his/her illness    Outcome: Progressing    Goal: Complete daily ADLs, including personal hygiene independently, as able  Interventions:  - Observe, teach, and assist patient with ADLS  - Monitor and promote a balance of rest/activity, with adequate nutrition and elimination    Outcome: Progressing      Problem: Risk for Violence/Aggression Toward Others  Goal: Treatment Goal: Refrain from acts of violence/aggression during length of stay, and demonstrate improved impulse control at the time of discharge  Outcome: Progressing    Goal: Refrain from destructive acts on the environment or property  Outcome: Progressing

## 2017-11-01 NOTE — PROGRESS NOTES
[address] Internal Medicine Consultation Follow Up Progress Note  Patient: Pam Hammond 32 y o  female   MRN: [id number]  PCP: Bernice Barney MD  Unit/Bed#: SK9 596-08 Encounter: [id number]  Date Of Visit: 11/01/17    Primary Service: Floresita Christianson MD  Reason for Consultation: vomiting    Assessment & Plan:    · Vomiting  · Patient reports that she had 1 episode of vomiting yesterday morning shortly after taking her levothyroxine  She reports that she has been on levothyroxine for what she believes is 12 years and this medication is not new for her  This episode did not recur with this morning's levothyroxine dose  We will add PRN Zofran  Encourage PO fluids and food as tolerated  · Hypothyroidism  · TSH on admission initially noted to be WNL at 2 780  TSH was repeated 13 days later and was found to be elevated at 7 310  FT4 was WNL at 1 24 and FT3 low at 1 79  At that time, the patient's PCP was contacted who recommended an increase in the patient's levothyroxine (was increased from 100mcg daily to 112mcg daily)  The patient reports that she has been on levothyroxine for 12 years and takes this early in the morning on an empty stomach before breakfast  Recommend repeat TFTs in 4-6 weeks as outpatient with PCP; no further inpatient TFTs necessary  Thank you for allowing us to participate in the care of Ms Pam GONZALEZ will sign off  Please do not hesitate to call with questions  Education and Discussions with Family / Patient: Patient    Time Spent for Care: 20 minutes  More than 50% of total time spent on counseling and coordination of care as described above      Is patient acceptable for discharge from medicine standpoint?: patient is medically stable  Discharge Recommendations:   Continue levothyroxine at current dose: 112mcg daily  Follow up with PCP within 1 week of discharge  Repeat TFTs in 4-6 weeks  Hypothyroidism discharge care in [place] placed in discharge instruction navigator Subjective:   Ms Moncho Rivera reports that she had 1 episode of non-bloody vomiting yesterday morning after taking her levothyroxine  She reports that she has been on this medication for 12 years and she takes it every morning before her breakfast  She reports that she has never experienced vomiting with her levothyroxine in the past and this did not recur today  She reports that she was having some abdominal pain yesterday morning about the same time that resolved with the vomiting  She reports that she has been eating and drinking without difficulty     Objective:     Vitals:   Temp (24hrs), Av 8 °F (36 6 °C), Min:97 5 °F (36 4 °C), Max:98 1 °F (36 7 °C)    HR:  [63-78] 78  Resp:  [16] 16  BP: (110-111)/(64-71) 111/71  Body mass index is 25 51 kg/m²  Input and Output Summary (last 24 hours):     No intake or output data in the 24 hours ending 17 1451    Physical Exam:     Physical Exam   Constitutional: She is oriented to person, place, and time  Patient seen sitting upright on exam table in exam room  She is very pleasant and cooperative  Non-toxic appearing   Cardiovascular: Normal rate and regular rhythm  Pulmonary/Chest: Effort normal and breath sounds normal  No respiratory distress  She has no wheezes  She exhibits no tenderness  Abdominal: Soft  Bowel sounds are normal  She exhibits no distension  There is no tenderness  There is no guarding  Musculoskeletal: She exhibits no edema  Neurological: She is alert and oriented to person, place, and time  Skin: Skin is warm  Vitals reviewed  Additional Data:     TSHs , FT4, FT3, CMP, lipid panel, CBC    * I Have Reviewed All Lab Data Listed Above    * Additional Pertinent Lab Tests Reviewed: No New Labs Available For Today    Imaging: no new imaging reports available for review today     Last 24 Hours Medication List:     ARIPiprazole 20 mg Oral QPM   levothyroxine 112 mcg Oral Early Morning        Today, Patient Was Seen By: Nancy Higuera GLENDY Woodward    ** Please Note: This note has been constructed using a voice recognition system   **

## 2017-11-01 NOTE — PROGRESS NOTES
Patient denies all symptoms using  [id number]  Patient also repeated that she does not hear voices in her head but out in the hallway  Patient stated both her and family want to know when she is being discharged  I explained that she needs to ask the psychiatrist that question tomorrow

## 2017-11-02 RX ORDER — DOCUSATE SODIUM 100 MG/1
100 CAPSULE, LIQUID FILLED ORAL 2 TIMES DAILY PRN
Status: DISCONTINUED | OUTPATIENT
Start: 2017-11-02 | End: 2017-11-06 | Stop reason: HOSPADM

## 2017-11-02 RX ADMIN — ARIPIPRAZOLE 20 MG: 10 TABLET ORAL at 18:22

## 2017-11-02 RX ADMIN — LEVOTHYROXINE SODIUM 112 MCG: 112 TABLET ORAL at 06:09

## 2017-11-02 NOTE — CASE MANAGEMENT
Left message for pt's sister, Damon Bone, to get her impressions of pt's current condition and if pt is near her baseline level of functioning [phone]

## 2017-11-02 NOTE — PROGRESS NOTES
PT denies all s/s  Pt has been seculisve and in her room  Pt did tell Rn she would like to do her laundry today  Pt has bright affect when conversing w/ RN  Pt is medication complaint

## 2017-11-02 NOTE — PROGRESS NOTES
Progress Note - [id number] Lucy Braden 32 y o  female MRN: [id number]   Unit/Bed#: GA3 766-01 Encounter: [id number]    Interviewed with language Line,  # [id number]    Behavior over the last 24 hours: clarence Adams is still anxious and paranoid, - requested room change last night, was afraid that she was going to get her roommate's illness  Reports today when asked about her request last night that she had  "weird sensation in that room like someone has  in that room"  She also reported to staff yesterday auditory hallucinations while in hallway and when eating dinner - requested to eat dinner alone last night  Has been taking medications  Attends groups more often  Sleep: normal  Appetite: normal  Medication side effects: Yes - mild dizziness   ROS: reports constipation and dizziness, denies any shortness of breath or chest pain    Mental Status Evaluation:    Appearance:  casually dressed   Behavior:  cooperative   Speech:  soft   Mood:  dysphoric, anxious   Affect:  constricted   Thought Process:  concrete   Associations: concrete associations   Thought Content:  paranoid delusions, somatic preoccupation   Perceptual Disturbances: vague auditory hallucinations, no visual hallucinations   Risk Potential: Suicidal ideation - None  Homicidal ideation - None  Potential for aggression - No   Sensorium:  oriented to person, place and time/date   Memory:  recent and remote memory grossly intact   Consciousness:  alert and awake   Attention: decreased concentration and decreased attention span   Insight:  impaired   Judgment: impaired   Gait/Station: normal gait/station and normal balance   Motor Activity: no abnormal movements     Vital signs in last 24 hours:    Temp:  [97 9 °F (36 6 °C)] 97 9 °F (36 6 °C)  HR:  [66-72] 66  Resp:  [16] 16  BP: (109-123)/(64-65) 109/65    Laboratory results:  I have personally reviewed all pertinent laboratory/tests results      Progress Toward Goals: no significant progress, remains paranoid, poor reality testing    Assessment/Plan   Principal Problem:    Schizoaffective disorder, bipolar type ([facility])  Active Problems:    BIANCA (generalized anxiety disorder)    Schizoaffective disorder ([facility] [address] )    Hypothyroid    Recommended Treatment:     Planned medication and treatment changes: All current active medications have been reviewed  Encourage group therapy, milieu therapy and occupational therapy  Behavioral Health checks every 15 minutes  Appreciate medical input - Levothyroxine continued   to check with sister regarding progress - sister was planning on visiting yesterday    Continue current medications:      ARIPiprazole 20 mg Oral QPM   levothyroxine 112 mcg Oral Early Morning       Risks / Benefits of Treatment:    Risks, benefits, and possible side effects of medications explained to patient and patient verbalizes understanding and agreement for treatment  Risks of medications in pregnancy explained if female patient  Patient verbalizes understanding and agrees to notify her doctor if she becomes pregnant  Counseling / Coordination of Care:    Patient's progress discussed with staff in treatment team meeting  Medications, treatment progress and treatment plan reviewed with patient

## 2017-11-02 NOTE — PLAN OF CARE
Problem: Ineffective Coping  Goal: Identifies ineffective coping skills  Outcome: Progressing    Goal: Identifies healthy coping skills  Outcome: Progressing    Goal: Participates in unit activities  Interventions:  - Provide therapeutic environment   - Provide required programming   - Redirect inappropriate behaviors    Outcome: Not Progressing  PY does not participate in unit activities   Goal: Patient/Family participate in treatment and DC plans  Interventions:  - Provide therapeutic environment   Outcome: Progressing      Problem: Alteration in Thoughts and Perception  Goal: Treatment Goal: Gain control of psychotic behaviors/thinking, reduce/eliminate presenting symptoms and demonstrate improved reality functioning upon discharge  Outcome: Progressing    Goal: Verbalize thoughts and feelings  Interventions:  - Promote a nonjudgmental and trusting relationship with the patient through active listening and therapeutic communication  - Assess patient's level of functioning, behavior and potential for risk  - Engage patient in 1 on 1 interactions for a minimum of 15 minutes each session  - Encourage patient to express fears, feelings, frustrations, and discuss symptoms    - [place] patient to reality, help patient recognize reality-based thinking   - Administer medications as ordered and assess for potential side effects  - Provide the patient education related to the signs and symptoms of the illness and desired effects of prescribed medications   Outcome: Progressing    Goal: Refrain from acting on delusional thinking/internal stimuli  Interventions:  - Monitor patient closely, per order   - Utilize least restrictive measures   - Set reasonable limits, give positive feedback for acceptable   - Administer medications as ordered and monitor of potential side effects   Outcome: Progressing    Goal: Agree to be compliant with medication regime, as prescribed and report medication side effects  Interventions:  - Offer appropriate PRN medication and supervise ingestion; conduct aims, as needed    Outcome: Progressing    Goal: Recognize dysfunctional thoughts, communicate reality-based thoughts at the time of discharge  Interventions:  - Provide medication and psycho-education to assist patient in compliance and developing insight into his/her illness    Outcome: Progressing    Goal: Complete daily ADLs, including personal hygiene independently, as able  Interventions:  - Observe, teach, and assist patient with ADLS  - Monitor and promote a balance of rest/activity, with adequate nutrition and elimination    Outcome: Progressing      Problem: Risk for Violence/Aggression Toward Others  Goal: Treatment Goal: Refrain from acts of violence/aggression during length of stay, and demonstrate improved impulse control at the time of discharge  Outcome: Progressing    Goal: Refrain from destructive acts on the environment or property  Outcome: Progressing

## 2017-11-03 RX ADMIN — ARIPIPRAZOLE 20 MG: 10 TABLET ORAL at 17:28

## 2017-11-03 RX ADMIN — LEVOTHYROXINE SODIUM 112 MCG: 112 TABLET ORAL at 05:58

## 2017-11-03 NOTE — CASE MANAGEMENT
Pt had met with her ICM from [facility] for over an hour; the ICM spoke [place] and her name is Ashley  [phone]  Ashley stated that the pt was very appropriate in conversation, that she appeared motivated for outpt treatment and compliance with appts and medication  [place] states she will take the pt to her first appointment and also keep an eye on her medication compliance  [place] states that pt told her that the voices have greatly decreased and that she doesn't hear people talking anymore but that she'll hear an occasional noise and she's not sure if it is real or unreal but that it really doesn't bother her  [place] states pt did have an obsessive type thought regarding eating and being fat and pt's family states this thought has been very long-standing in pt's history  [place] states she will make an appt for pt to see a dietician as an outpt to demonstrate to pt that she is not fat and what is healthy to eat and what is not and put this distorted thought process more into a rational context  Pt's sister feels she is at baseline

## 2017-11-03 NOTE — PLAN OF CARE
Problem: DISCHARGE PLANNING  Goal: Discharge to home or other facility with appropriate resources  INTERVENTIONS:  - Identify barriers to discharge w/patient and caregiver  - Arrange for needed discharge resources and transportation as appropriate  - Identify discharge learning needs (meds, wound care, etc )  - Arrange for interpretive services to assist at discharge as needed  - Refer to Case Management Department for coordinating discharge planning if the patient needs post-hospital services based on physician/advanced practitioner order or complex needs related to functional status, cognitive ability, or social support system   Outcome: Progressing      Problem: Ineffective Coping  Goal: Identifies ineffective coping skills  Outcome: Adequate for Discharge    Goal: Identifies healthy coping skills  Outcome: Adequate for Discharge    Goal: Participates in unit activities  Interventions:  - Provide therapeutic environment   - Provide required programming   - Redirect inappropriate behaviors    Outcome: Progressing    Goal: Participates in unit activities  Interventions:  - Provide therapeutic environment   - Provide required programming   - Redirect inappropriate behaviors    Outcome: Adequate for Discharge    Goal: Patient/Family participate in treatment and DC plans  Interventions:  - Provide therapeutic environment   Outcome: Progressing      Problem: Alteration in Thoughts and Perception  Goal: Treatment Goal: Gain control of psychotic behaviors/thinking, reduce/eliminate presenting symptoms and demonstrate improved reality functioning upon discharge  Outcome: Progressing    Goal: Verbalize thoughts and feelings  Interventions:  - Promote a nonjudgmental and trusting relationship with the patient through active listening and therapeutic communication  - Assess patient's level of functioning, behavior and potential for risk  - Engage patient in 1 on 1 interactions for a minimum of 15 minutes each session  - Encourage patient to express fears, feelings, frustrations, and discuss symptoms    - [place] patient to reality, help patient recognize reality-based thinking   - Administer medications as ordered and assess for potential side effects  - Provide the patient education related to the signs and symptoms of the illness and desired effects of prescribed medications   Outcome: Adequate for Discharge    Goal: Refrain from acting on delusional thinking/internal stimuli  Interventions:  - Monitor patient closely, per order   - Utilize least restrictive measures   - Set reasonable limits, give positive feedback for acceptable   - Administer medications as ordered and monitor of potential side effects   Outcome: Progressing    Goal: Agree to be compliant with medication regime, as prescribed and report medication side effects  Interventions:  - Offer appropriate PRN medication and supervise ingestion; conduct aims, as needed    Outcome: Adequate for Discharge    Goal: Recognize dysfunctional thoughts, communicate reality-based thoughts at the time of discharge  Interventions:  - Provide medication and psycho-education to assist patient in compliance and developing insight into his/her illness    Outcome: Progressing    Goal: Complete daily ADLs, including personal hygiene independently, as able  Interventions:  - Observe, teach, and assist patient with ADLS  - Monitor and promote a balance of rest/activity, with adequate nutrition and elimination    Outcome: Completed Date Met: 11/03/17      Problem: Risk for Violence/Aggression Toward Others  Goal: Treatment Goal: Refrain from acts of violence/aggression during length of stay, and demonstrate improved impulse control at the time of discharge  Outcome: Adequate for Discharge    Goal: Refrain from destructive acts on the environment or property  Outcome: Adequate for Discharge

## 2017-11-03 NOTE — PLAN OF CARE
Problem: DISCHARGE PLANNING  Goal: Discharge to home or other facility with appropriate resources  INTERVENTIONS:  - Identify barriers to discharge w/patient and caregiver  - Arrange for needed discharge resources and transportation as appropriate  - Identify discharge learning needs (meds, wound care, etc )  - Arrange for interpretive services to assist at discharge as needed  - Refer to Case Management Department for coordinating discharge planning if the patient needs post-hospital services based on physician/advanced practitioner order or complex needs related to functional status, cognitive ability, or social support system   Outcome: Progressing      Problem: Ineffective Coping  Goal: Identifies ineffective coping skills  Outcome: Progressing    Goal: Identifies healthy coping skills  Outcome: Progressing    Goal: Participates in unit activities  Interventions:  - Provide therapeutic environment   - Provide required programming   - Redirect inappropriate behaviors    Outcome: Progressing    Goal: Participates in unit activities  Interventions:  - Provide therapeutic environment   - Provide required programming   - Redirect inappropriate behaviors    Outcome: Progressing    Goal: Patient/Family participate in treatment and DC plans  Interventions:  - Provide therapeutic environment   Outcome: Progressing      Problem: Alteration in Thoughts and Perception  Goal: Treatment Goal: Gain control of psychotic behaviors/thinking, reduce/eliminate presenting symptoms and demonstrate improved reality functioning upon discharge  Outcome: Progressing    Goal: Verbalize thoughts and feelings  Interventions:  - Promote a nonjudgmental and trusting relationship with the patient through active listening and therapeutic communication  - Assess patient's level of functioning, behavior and potential for risk  - Engage patient in 1 on 1 interactions for a minimum of 15 minutes each session  - Encourage patient to express fears, feelings, frustrations, and discuss symptoms    - [place] patient to reality, help patient recognize reality-based thinking   - Administer medications as ordered and assess for potential side effects  - Provide the patient education related to the signs and symptoms of the illness and desired effects of prescribed medications   Outcome: Progressing    Goal: Refrain from acting on delusional thinking/internal stimuli  Interventions:  - Monitor patient closely, per order   - Utilize least restrictive measures   - Set reasonable limits, give positive feedback for acceptable   - Administer medications as ordered and monitor of potential side effects   Outcome: Progressing    Goal: Agree to be compliant with medication regime, as prescribed and report medication side effects  Interventions:  - Offer appropriate PRN medication and supervise ingestion; conduct aims, as needed    Outcome: Progressing    Goal: Recognize dysfunctional thoughts, communicate reality-based thoughts at the time of discharge  Interventions:  - Provide medication and psycho-education to assist patient in compliance and developing insight into his/her illness    Outcome: Progressing    Goal: Complete daily ADLs, including personal hygiene independently, as able  Interventions:  - Observe, teach, and assist patient with ADLS  - Monitor and promote a balance of rest/activity, with adequate nutrition and elimination    Outcome: Progressing      Problem: Risk for Violence/Aggression Toward Others  Goal: Treatment Goal: Refrain from acts of violence/aggression during length of stay, and demonstrate improved impulse control at the time of discharge  Outcome: Progressing    Goal: Refrain from destructive acts on the environment or property  Outcome: Progressing

## 2017-11-03 NOTE — PROGRESS NOTES
Progress Note - [id number] Lucy Braden 32 y o  female MRN: [id number]   Unit/Bed#: GH3 766-01 Encounter: [id number]    Interviewed with [address] Braden,  [id number]    Behavior over the last 24 hours: improving  Ngoc seems less paranoid and less anxious today  Had a good visit with her new German-speaking ICM yesterday  States auditory hallucinations of "noises" are less frequent today - "they are coming down"  Still somewhat somatic and focusing on headaches today  Compliant with medications  More visible on the unit  Sleep: normal  Appetite: normal  Medication side effects: No   ROS: reports headache, denies any chest pain or abdominal pain    Mental Status Evaluation:    Appearance:  casually dressed   Behavior:  cooperative, better eye contact   Speech:  soft   Mood:  less anxious, less dysphoric   Affect:  slightly brighter   Thought Process:  concrete   Associations: concrete associations   Thought Content:  somatic preoccupation, less paranoid   Perceptual Disturbances: auditory hallucinations still present, but less frequent, no visual hallucinations   Risk Potential: Suicidal ideation - None  Homicidal ideation - None  Potential for aggression - No   Sensorium:  oriented to person, place and time/date   Memory:  recent and remote memory grossly intact   Consciousness:  alert and awake   Attention: attention span and concentration appear shorter than expected for age   Insight:  improving and partial   Judgment: improving and partial   Gait/Station: normal gait/station and normal balance   Motor Activity: no abnormal movements     Vital signs in last 24 hours:    Temp:  [98 1 °F (36 7 °C)] 98 1 °F (36 7 °C)  HR:  [92-98] 92  Resp:  [16] 16  BP: (126-128)/(67-80) 128/80    Laboratory results:  I have personally reviewed all pertinent laboratory/tests results      Progress Toward Goals: progressing, reality testing is slowly improving, less anxious, less paranoid    Assessment/Plan Principal Problem:    Schizoaffective disorder, bipolar type ([facility] )  Active Problems:    BIANCA (generalized anxiety disorder)    Schizoaffective disorder ([facility] )    Hypothyroid    Recommended Treatment:     Planned medication and treatment changes: All current active medications have been reviewed  Encourage group therapy, milieu therapy and occupational therapy  Behavioral Health checks every 15 minutes  Observe over the weekend if continues to improve  Continue current medications:      ARIPiprazole 20 mg Oral QPM   levothyroxine 112 mcg Oral Early Morning       Risks / Benefits of Treatment:    Risks, benefits, and possible side effects of medications explained to patient and patient verbalizes understanding and agreement for treatment  Risks of medications in pregnancy explained if female patient  Patient verbalizes understanding and agrees to notify her doctor if she becomes pregnant  Counseling / Coordination of Care:    Patient's progress discussed with staff in treatment team meeting  Medications, treatment progress and treatment plan reviewed with patient

## 2017-11-03 NOTE — CASE MANAGEMENT
Pt has a bright affect, is hoping for discharge early next week, denies hearing any voices  LESLIE appointment was re-set, ICM  Fish wants to be called when exact date of discharge is set for the patient   Fish states she will take the pt to her first outpt appt at [facility]

## 2017-11-04 RX ORDER — ARIPIPRAZOLE 10 MG/1
20 TABLET ORAL
Status: DISCONTINUED | OUTPATIENT
Start: 2017-11-04 | End: 2017-11-06 | Stop reason: HOSPADM

## 2017-11-04 RX ADMIN — LEVOTHYROXINE SODIUM 112 MCG: 112 TABLET ORAL at 05:55

## 2017-11-04 RX ADMIN — ARIPIPRAZOLE 20 MG: 10 TABLET ORAL at 21:02

## 2017-11-04 NOTE — PROGRESS NOTES
Patient is denying any auditory hallucinations as stated by patients father when he was visiting  Patient pleasant and cooperative

## 2017-11-04 NOTE — PROGRESS NOTES
Sitting in room most of the day reading book , requested " something for a headache from the air conditioning "  Given Tylenol   Rufina Gonsalves  Pain scale of 6

## 2017-11-04 NOTE — PROGRESS NOTES
Pt is hopeful for d/c next week  Pt remains in her room most of shift reading  Pt denies AVH presently

## 2017-11-04 NOTE — PROGRESS NOTES
Patient pleasant and cooperative  Does not complain of a headache but did not request any medication   Alert and oriented x4

## 2017-11-04 NOTE — PROGRESS NOTES
Progress Note - Mayank [age] y o  female MRN: @MRN   Unit/Bed#: FY7 168-87 Encounter: [id number]        The patient was seen for continuing care and reviewed with staff  Report from staff regarding this patient received and discussed, and records reviewed prior to seeing this patient   [place] safe denied voices  Paranoid ideations are less evident  Still isolating in the unit with some participations in group  Had concerns about the increase of TSH and taking Synthroid  We discussed that TSH increased minutes the logical thyroid hormone and this is why Synthroid supplements natural thyroid hormone  The patient expressed her understanding  Nevertheless the patient continued to be anxious  The patient slept well and appetite was okay  Patient remains anxious, appropriate, cooperative with milieu, cooperative with staff, delusional, distressed and doing better today      Medication side effects: No   ROS: no complaints    Mental Status Evaluation:    Appearance:  dressed appropriately, casually dressed   Behavior:  cooperative   Mood:  anxious   Affect: constricted    Speech:  decreased rate, slow   Language: appropriate   Thought Process:  concrete   Associations: concrete associations   Thought Content:  paranoid delusions   Perceptual Disturbances: denies auditory hallucinations when asked   Risk Potential: Suicidal ideation - None  Homicidal ideation - None  Potential for aggression - No   Sensorium:  oriented to person, place and time/date   Memory:  recent and remote memory grossly intact   Consciousness:  alert and awake   Attention: attention span and concentration are normal   Intellect: normal   Fund of Knowledge: awareness of current events: yes   Insight:  impaired   Judgment: limited   Muscle Tone: normal   Gait/Station: normal gait/station and normal balance   Motor Activity: no abnormal movements         Laboratory results:  I have personally reviewed all pertinent laboratory results  No results for input(s): HGBA1C, NA, K, CL, CO2, GLUCOSE, CREATININE, BUN, MG, PHOS in the last 72 hours  Invalid input(s): CA  No results for input(s): WBC, RBC, HGB, HCT, MCV, MCH, RDW, PLT in the last 72 hours  No results for input(s): CREATININE, BUN, NA, K, CL, CO2, GLUCOSE, PROT, ALT, AST, BILIDIR in the last 72 hours  Invalid input(s): CA, AKLPHOS  No results for input(s): ALKPHOS, AST, ALT, GGT, BILITOT, BILIDIR, ALBUMIN, INR, AMYLASE, LIPASE in the last 72 hours  No results for input(s): Kitty Melissa in the last 72 hours  Invalid input(s): CKMB, PBNP  No results for input(s): CHOL, LDLDIRECT, HDL, TRIG in the last 72 hours  No results for input(s): CRP, SEDRATE, PONCHO, HAV, HEPAIGM, HEPBIGM, HEPBCAB, HEPCAB in the last 72 hours  Invalid input(s): HEAG    CBC: No results for input(s): WBC, RBC, HGB, HCT, PLT in the last 72 hours  BMP: No results for input(s): NA, K, CL, CO2, BUN, GLU in the last 72 hours  Invalid input(s): CREA        Progress Toward Goals: improving slowly    Assessment/Plan   Principal Problem:    Schizoaffective disorder, bipolar type (HCC)  Active Problems:    BIANCA (generalized anxiety disorder)    Schizoaffective disorder (HCC)    Hypothyroid      Recommended Treatment:  Will continue the present medications because the patient is improving  His psychosis seems to be less evident  Will follow up on TSH increased  The patient is less anxious but her anxiety still evident  Brief supportive therapy was provided    Planned medication and treatment changes: All current active medications have been reviewed  Continue treatment with group therapy, milieu therapy, occupational therapy and medication management  Risks / Benefits of Treatment:    Risks, benefits, and possible side effects of medications explained to patient and patient verbalizes understanding and agreement for treatment      Counseling / Coordination of Care:    Patient's progress reviewed with nursing staff  Medication education provided to patient  Patient's progress reviewed with case management staff  Coping strategies discussed with patient  ** Please Note: This note has been constructed using a voice recognition system   **

## 2017-11-05 RX ORDER — LORATADINE 10 MG/1
10 TABLET ORAL DAILY PRN
Status: DISCONTINUED | OUTPATIENT
Start: 2017-11-05 | End: 2017-11-06 | Stop reason: HOSPADM

## 2017-11-05 RX ADMIN — ARIPIPRAZOLE 20 MG: 10 TABLET ORAL at 21:00

## 2017-11-05 RX ADMIN — LEVOTHYROXINE SODIUM 112 MCG: 112 TABLET ORAL at 06:02

## 2017-11-05 NOTE — PROGRESS NOTES
Progress Note - Mayank Coyne 32 y o  female MRN: @MRN   Unit/Bed#: TM3 567-10 Encounter: [id number]        The patient was seen for continuing care and reviewed with staff  Report from staff regarding this patient received and discussed, and records reviewed prior to seeing this patient   She felt less anxious and less depressed  Residual paranoid ideations about his father cannot be excluded  Overall the patient is calm, tolerated milieu quite well  We discussed her follow-up appointments after discharge  The patient's sleep was improving  Her appetite is normal        Medication side effects: No   ROS: no complaints    Mental Status Evaluation:    Appearance:  dressed appropriately, casually dressed   Behavior:  cooperative, calm   Mood:  depressed, anxious   Affect: less constricted, constricted    Speech:  slow   Language: appropriate and appropriate based on results of translation   Thought Process:  concrete   Associations: concrete associations   Thought Content:  paranoid delusions   Perceptual Disturbances: no auditory hallucinations, no visual hallucinations   Risk Potential: Suicidal ideation - None  Homicidal ideation - None  Potential for aggression - No   Sensorium:  oriented to person, place and time/date   Memory:  recent and remote memory grossly intact   Consciousness:  alert and awake   Attention: attention span and concentration are normal   Intellect: normal   Fund of Knowledge: awareness of current events: yes   Insight:  impaired due to psychosis   Judgment: impaired due to psychosis   Muscle Tone: normal   Gait/Station: normal gait/station   Motor Activity: no abnormal movements         Laboratory results:  I have personally reviewed all pertinent laboratory results  No results for input(s): HGBA1C, NA, K, CL, CO2, GLUCOSE, CREATININE, BUN, MG, PHOS in the last 72 hours      Invalid input(s): CA  No results for input(s): WBC, RBC, HGB, HCT, MCV, MCH, RDW, PLT in the last 72 hours  No results for input(s): CREATININE, BUN, NA, K, CL, CO2, GLUCOSE, PROT, ALT, AST, BILIDIR in the last 72 hours  Invalid input(s): CA, AKLPHOS  No results for input(s): ALKPHOS, AST, ALT, GGT, BILITOT, BILIDIR, ALBUMIN, INR, AMYLASE, LIPASE in the last 72 hours  No results for input(s): Sherel Pipe in the last 72 hours  Invalid input(s): CKMB, PBNP  No results for input(s): CHOL, LDLDIRECT, HDL, TRIG in the last 72 hours  No results for input(s): CRP, SEDRATE, PONCHO, HAV, HEPAIGM, HEPBIGM, HEPBCAB, HEPCAB in the last 72 hours  Invalid input(s): HEAG    CBC: No results for input(s): WBC, RBC, HGB, HCT, PLT in the last 72 hours  BMP: No results for input(s): NA, K, CL, CO2, BUN, GLU in the last 72 hours  Invalid input(s): CREA        Progress Toward Goals: improving slowly    Assessment/Plan   Principal Problem:    Schizoaffective disorder, bipolar type (HCC)  Active Problems:    BIANCA (generalized anxiety disorder)    Schizoaffective disorder (HCC)    Hypothyroid      Recommended Treatment:  The patient is slowly improving will continue the same medications  Her address his psychotic symptoms without direct challenging, improvement of her mood was less depression  The patient denies suicidal ideations to his safety was evaluated as well  Planned medication and treatment changes: All current active medications have been reviewed  Continue treatment with group therapy, milieu therapy, occupational therapy and medication management  Risks / Benefits of Treatment:    Risks, benefits, and possible side effects of medications explained to patient and patient verbalizes understanding and agreement for treatment  Counseling / Coordination of Care:    Patient's progress reviewed with nursing staff  Supportive therapy provided to patient  ** Please Note: This note has been constructed using a voice recognition system   **    [id number]

## 2017-11-05 NOTE — PLAN OF CARE
Problem: DISCHARGE PLANNING  Goal: Discharge to home or other facility with appropriate resources  INTERVENTIONS:  - Identify barriers to discharge w/patient and caregiver  - Arrange for needed discharge resources and transportation as appropriate  - Identify discharge learning needs (meds, wound care, etc )  - Arrange for interpretive services to assist at discharge as needed  - Refer to Case Management Department for coordinating discharge planning if the patient needs post-hospital services based on physician/advanced practitioner order or complex needs related to functional status, cognitive ability, or social support system   Outcome: Progressing      Problem: Ineffective Coping  Goal: Identifies ineffective coping skills  Outcome: Progressing    Goal: Identifies healthy coping skills  Outcome: Progressing    Goal: Participates in unit activities  Interventions:  - Provide therapeutic environment   - Provide required programming   - Redirect inappropriate behaviors    Outcome: Progressing    Goal: Participates in unit activities  Interventions:  - Provide therapeutic environment   - Provide required programming   - Redirect inappropriate behaviors    Outcome: Progressing    Goal: Patient/Family participate in treatment and DC plans  Interventions:  - Provide therapeutic environment   Outcome: Progressing      Problem: Alteration in Thoughts and Perception  Goal: Treatment Goal: Gain control of psychotic behaviors/thinking, reduce/eliminate presenting symptoms and demonstrate improved reality functioning upon discharge  Outcome: Progressing    Goal: Verbalize thoughts and feelings  Interventions:  - Promote a nonjudgmental and trusting relationship with the patient through active listening and therapeutic communication  - Assess patient's level of functioning, behavior and potential for risk  - Engage patient in 1 on 1 interactions for a minimum of 15 minutes each session  - Encourage patient to express fears, feelings, frustrations, and discuss symptoms    - [place] patient to reality, help patient recognize reality-based thinking   - Administer medications as ordered and assess for potential side effects  - Provide the patient education related to the signs and symptoms of the illness and desired effects of prescribed medications   Outcome: Progressing    Goal: Refrain from acting on delusional thinking/internal stimuli  Interventions:  - Monitor patient closely, per order   - Utilize least restrictive measures   - Set reasonable limits, give positive feedback for acceptable   - Administer medications as ordered and monitor of potential side effects   Outcome: Progressing    Goal: Agree to be compliant with medication regime, as prescribed and report medication side effects  Interventions:  - Offer appropriate PRN medication and supervise ingestion; conduct aims, as needed    Outcome: Progressing    Goal: Recognize dysfunctional thoughts, communicate reality-based thoughts at the time of discharge  Interventions:  - Provide medication and psycho-education to assist patient in compliance and developing insight into his/her illness    Outcome: Progressing      Problem: Risk for Violence/Aggression Toward Others  Goal: Treatment Goal: Refrain from acts of violence/aggression during length of stay, and demonstrate improved impulse control at the time of discharge  Outcome: Progressing    Goal: Refrain from destructive acts on the environment or property  Outcome: Progressing

## 2017-11-06 VITALS
SYSTOLIC BLOOD PRESSURE: 116 MMHG | RESPIRATION RATE: 16 BRPM | DIASTOLIC BLOOD PRESSURE: 65 MMHG | HEART RATE: 80 BPM | HEIGHT: 63 IN | BODY MASS INDEX: 25.52 KG/M2 | TEMPERATURE: 98.3 F | WEIGHT: 144 LBS

## 2017-11-06 LAB
T3FREE SERPL-MCNC: 2.23 PG/ML (ref 2.3–4.2)
TSH SERPL DL<=0.05 MIU/L-ACNC: 1.17 UIU/ML (ref 0.36–3.74)

## 2017-11-06 PROCEDURE — 84443 ASSAY THYROID STIM HORMONE: CPT | Performed by: PSYCHIATRY & NEUROLOGY

## 2017-11-06 PROCEDURE — 84481 FREE ASSAY (FT-3): CPT | Performed by: PSYCHIATRY & NEUROLOGY

## 2017-11-06 RX ORDER — LEVOTHYROXINE SODIUM 112 UG/1
112 TABLET ORAL
Qty: 30 TABLET | Refills: 0 | Status: SHIPPED | OUTPATIENT
Start: 2017-11-07 | End: 2017-12-07

## 2017-11-06 RX ORDER — ARIPIPRAZOLE 20 MG/1
20 TABLET ORAL
Qty: 30 TABLET | Refills: 1 | Status: SHIPPED | OUTPATIENT
Start: 2017-11-06 | End: 2018-01-05

## 2017-11-06 RX ADMIN — LEVOTHYROXINE SODIUM 112 MCG: 112 TABLET ORAL at 05:50

## 2017-11-06 NOTE — NURSING NOTE
PT and pt's ,other/farther were given education, scripts, and discharge instruction both given in [address]  and [address]  All belongings returned to pt  Pt and family told to call unit if they have any questions

## 2017-11-06 NOTE — CASE MANAGEMENT
ANABELLA met with Ngoc to review the discharge instructions  ANABELLA reviewed the intake appointment at [facility] as well as the instructions from the ICM  Ngoc verbalized an understanding  Discharge clinical faxed to [facility] by [facility]

## 2017-11-06 NOTE — DISCHARGE INSTR - LAB
Contact Information: If you have any questions, concerns, pended studies, tests and/or procedures, or emergencies regarding your inpatient behavioral health visit  Please contact [facility] [phone] and ask to speak to a , nurse or physician  A contact is available 24 hours/ 7 days a week at this number  Summary of Procedures Performed During your Stay:  Below is a list of major procedures performed during your hospital stay and a summary of results:  - No major procedures performed  Pending Studies     None        If studies are pending at discharge, follow up with your PCP and/or referring provider

## 2017-11-06 NOTE — PLAN OF CARE
Problem: DISCHARGE PLANNING  Goal: Discharge to home or other facility with appropriate resources  INTERVENTIONS:  - Identify barriers to discharge w/patient and caregiver  - Arrange for needed discharge resources and transportation as appropriate  - Identify discharge learning needs (meds, wound care, etc )  - Arrange for interpretive services to assist at discharge as needed  - Refer to Case Management Department for coordinating discharge planning if the patient needs post-hospital services based on physician/advanced practitioner order or complex needs related to functional status, cognitive ability, or social support system   Outcome: Completed Date Met: 11/06/17      Problem: Ineffective Coping  Goal: Identifies ineffective coping skills  Outcome: Completed Date Met: 11/06/17    Goal: Identifies healthy coping skills  Outcome: Completed Date Met: 11/06/17    Goal: Participates in unit activities  Interventions:  - Provide therapeutic environment   - Provide required programming   - Redirect inappropriate behaviors    Outcome: Completed Date Met: 11/06/17    Goal: Participates in unit activities  Interventions:  - Provide therapeutic environment   - Provide required programming   - Redirect inappropriate behaviors    Outcome: Completed Date Met: 11/06/17    Goal: Patient/Family participate in treatment and DC plans  Interventions:  - Provide therapeutic environment   Outcome: Completed Date Met: 11/06/17      Problem: Alteration in Thoughts and Perception  Goal: Treatment Goal: Gain control of psychotic behaviors/thinking, reduce/eliminate presenting symptoms and demonstrate improved reality functioning upon discharge  Outcome: Completed Date Met: 11/06/17    Goal: Verbalize thoughts and feelings  Interventions:  - Promote a nonjudgmental and trusting relationship with the patient through active listening and therapeutic communication  - Assess patient's level of functioning, behavior and potential for risk  - Engage patient in 1 on 1 interactions for a minimum of 15 minutes each session  - Encourage patient to express fears, feelings, frustrations, and discuss symptoms    - Lagrange patient to reality, help patient recognize reality-based thinking   - Administer medications as ordered and assess for potential side effects  - Provide the patient education related to the signs and symptoms of the illness and desired effects of prescribed medications   Outcome: Completed Date Met: 11/06/17    Goal: Refrain from acting on delusional thinking/internal stimuli  Interventions:  - Monitor patient closely, per order   - Utilize least restrictive measures   - Set reasonable limits, give positive feedback for acceptable   - Administer medications as ordered and monitor of potential side effects   Outcome: Completed Date Met: 11/06/17    Goal: Agree to be compliant with medication regime, as prescribed and report medication side effects  Interventions:  - Offer appropriate PRN medication and supervise ingestion; conduct aims, as needed    Outcome: Completed Date Met: 11/06/17    Goal: Recognize dysfunctional thoughts, communicate reality-based thoughts at the time of discharge  Interventions:  - Provide medication and psycho-education to assist patient in compliance and developing insight into his/her illness    Outcome: Completed Date Met: 11/06/17      Problem: Risk for Violence/Aggression Toward Others  Goal: Treatment Goal: Refrain from acts of violence/aggression during length of stay, and demonstrate improved impulse control at the time of discharge  Outcome: Completed Date Met: 11/06/17    Goal: Refrain from destructive acts on the environment or property  Outcome: Completed Date Met: 11/06/17

## 2017-11-06 NOTE — PROGRESS NOTES
Pt has been visible in milieu but is not social and remains on the outskirts of milieu  Pt appears to be anxious and suspicious  when being assessed by RN but denies this  Pt denies AVH presently and was able to relate to Rn what medication she takes at [facility]

## 2017-11-06 NOTE — CASE MANAGEMENT
CM called and left a message with sister Christofer Guerrier regarding today's discharge  Phone [phone]  CM called Jose Antonio Kim, JULIO CÉSAR at [facility]END[facility] to notify of discharge today  Jose Antonio Kim asked CM to tell Ned Styles to call her on her cell phone when she gets home today  Cell [phone]  (noted in AVS)

## 2017-11-07 ENCOUNTER — GENERIC CONVERSION - ENCOUNTER (OUTPATIENT)
Dept: OTHER | Facility: OTHER | Age: 27
End: 2017-11-07

## 2017-12-05 NOTE — CASE MANAGEMENT
Pt's sister called stating pt's Medical Assistance lapsed because patient moved and did not report address change  Sister requested a letter from Physician indicating medical necessity  CM and Physician completed Health Sustaining medication form and employability form  Pt will pick this up today  Pt's sister is assisting with process  MA application also provided, along with discount prescription cards, a list of programs that assist with medication payments, and the address for local DPW office  Instructions provided

## 2018-01-16 NOTE — MISCELLANEOUS
History of Present Illness  TCM Communication [facility]: The patient is being contacted for follow-up after hospitalization  She was hospitalized at [facility] [place]  The date of admission: 10/13/17, date of discharge: 11/06/17  Diagnosis: PSYCHIATRY EVALUATION THYROID TENDERNESS  She was discharged to home  Medications were not reviewed today  She did not schedule a follow up appointment  Follow-up appointments with other specialists: LESLIE 11/13/17  The patient is currently asymptomatic  Counseling was provided to the patient  Communication performed and completed by Celestine Lopez   HPI: TCMNV/NC      Active Problems    1  Acute frontal sinusitis (461 1) (J01 10)   2  Allergic rhinitis (477 9) (J30 9)   3  Dry eye syndrome (375 15) (H04 129)   4  Facial pain (784 0) (R51)   5  Hypothyroidism (244 9) (E03 9)    Surgical History    1  History of Tonsillectomy With Adenoidectomy    Family History  Family History    1  Family history of Dyslipidemia   2  Family history of Essential Hypertension   3  Family history of Hypertension (V17 49)    Social History    · Never A Smoker    Current Meds   1  Fluticasone Propionate 50 MCG/ACT Nasal Suspension (Flonase); USE 2 SPRAYS IN   EACH NOSTRIL ONCE DAILY; Therapy: [id number] to (Last Rx:17Mar2015)  Requested for: 23JCW6654 Ordered   2  Levothyroxine Sodium 100 MCG Oral Tablet (Synthroid); TAKE ONE TABLET BY MOUTH   EVERY DAY; Therapy: 64YTW7860 to (Evaluate:16Mar2016)  Requested for: 79Tdz5922; Last   Rx:[id number] Ordered    Allergies    1   No Known Drug Allergies    Message   Recorded as Task   Date: 11/06/2017 04:03 PM, Created By: System   Task Name: [facility]   Assigned To: MARINE BARBAMARY,CLINICAL TEAM   Regarding Blair Solitario, Status: Active   Comment:    System - 06 Nov 2017 4:03 PM     Patient discharged from hospital   Patient Name: Effie Gillis  Patient YOB: 1990  Discharge Date: 11/6/2017  [facility] - 07 Nov 2017 7:28 AM TASK REASSIGNED: Previously Assigned To Mckenna Mora - 07 Nov 2017 1:59 PM     TASK REASSIGNED: Previously Assigned To [facility] AT,CLINICAL TEAM  I contact patient and pt states has a different primary doctor now Claudene Basset on [address] [employer]  TCMNV/NC     Signatures   Electronically signed by : Ann Marie Baerden, ; Nov 7 2017  2:01PM EST                       (Author)    Electronically signed by : VERNELL Joyner ; Nov 9 2017  2:23PM EST

## 2023-09-24 ENCOUNTER — APPOINTMENT (OUTPATIENT)
Dept: LAB | Facility: HOSPITAL | Age: 33
End: 2023-09-24
Payer: COMMERCIAL

## 2023-09-24 DIAGNOSIS — I51.9 MYXEDEMA HEART DISEASE: ICD-10-CM

## 2023-09-24 DIAGNOSIS — E55.9 AVITAMINOSIS D: ICD-10-CM

## 2023-09-24 DIAGNOSIS — E03.9 MYXEDEMA HEART DISEASE: ICD-10-CM

## 2023-09-24 LAB
25(OH)D3 SERPL-MCNC: 50.9 NG/ML (ref 30–100)
ALBUMIN SERPL BCP-MCNC: 4.2 G/DL (ref 3.5–5)
ALP SERPL-CCNC: 78 U/L (ref 34–104)
ALT SERPL W P-5'-P-CCNC: 10 U/L (ref 7–52)
ANION GAP SERPL CALCULATED.3IONS-SCNC: 4 MMOL/L
AST SERPL W P-5'-P-CCNC: 12 U/L (ref 13–39)
BASOPHILS # BLD AUTO: 0.07 THOUSANDS/ÂΜL (ref 0–0.1)
BASOPHILS NFR BLD AUTO: 1 % (ref 0–1)
BILIRUB SERPL-MCNC: 0.31 MG/DL (ref 0.2–1)
BUN SERPL-MCNC: 15 MG/DL (ref 5–25)
CALCIUM SERPL-MCNC: 9.1 MG/DL (ref 8.4–10.2)
CHLORIDE SERPL-SCNC: 104 MMOL/L (ref 96–108)
CHOLEST SERPL-MCNC: 165 MG/DL
CO2 SERPL-SCNC: 26 MMOL/L (ref 21–32)
CREAT SERPL-MCNC: 0.73 MG/DL (ref 0.6–1.3)
EOSINOPHIL # BLD AUTO: 0.27 THOUSAND/ÂΜL (ref 0–0.61)
EOSINOPHIL NFR BLD AUTO: 3 % (ref 0–6)
ERYTHROCYTE [DISTWIDTH] IN BLOOD BY AUTOMATED COUNT: 11.4 % (ref 11.6–15.1)
GFR SERPL CREATININE-BSD FRML MDRD: 108 ML/MIN/1.73SQ M
GLUCOSE P FAST SERPL-MCNC: 86 MG/DL (ref 65–99)
HCT VFR BLD AUTO: 42 % (ref 34.8–46.1)
HDLC SERPL-MCNC: 42 MG/DL
HGB BLD-MCNC: 13.4 G/DL (ref 11.5–15.4)
IMM GRANULOCYTES # BLD AUTO: 0.02 THOUSAND/UL (ref 0–0.2)
IMM GRANULOCYTES NFR BLD AUTO: 0 % (ref 0–2)
LDLC SERPL CALC-MCNC: 101 MG/DL (ref 0–100)
LYMPHOCYTES # BLD AUTO: 2.38 THOUSANDS/ÂΜL (ref 0.6–4.47)
LYMPHOCYTES NFR BLD AUTO: 28 % (ref 14–44)
MCH RBC QN AUTO: 27.7 PG (ref 26.8–34.3)
MCHC RBC AUTO-ENTMCNC: 31.9 G/DL (ref 31.4–37.4)
MCV RBC AUTO: 87 FL (ref 82–98)
MONOCYTES # BLD AUTO: 0.45 THOUSAND/ÂΜL (ref 0.17–1.22)
MONOCYTES NFR BLD AUTO: 5 % (ref 4–12)
NEUTROPHILS # BLD AUTO: 5.27 THOUSANDS/ÂΜL (ref 1.85–7.62)
NEUTS SEG NFR BLD AUTO: 63 % (ref 43–75)
NONHDLC SERPL-MCNC: 123 MG/DL
NRBC BLD AUTO-RTO: 0 /100 WBCS
PLATELET # BLD AUTO: 269 THOUSANDS/UL (ref 149–390)
PMV BLD AUTO: 10.9 FL (ref 8.9–12.7)
POTASSIUM SERPL-SCNC: 4.2 MMOL/L (ref 3.5–5.3)
PROT SERPL-MCNC: 7.7 G/DL (ref 6.4–8.4)
RBC # BLD AUTO: 4.84 MILLION/UL (ref 3.81–5.12)
SODIUM SERPL-SCNC: 134 MMOL/L (ref 135–147)
T4 FREE SERPL-MCNC: 0.97 NG/DL (ref 0.61–1.12)
TRIGL SERPL-MCNC: 111 MG/DL
TSH SERPL DL<=0.05 MIU/L-ACNC: 0.64 UIU/ML (ref 0.45–4.5)
WBC # BLD AUTO: 8.46 THOUSAND/UL (ref 4.31–10.16)

## 2023-09-24 PROCEDURE — 85025 COMPLETE CBC W/AUTO DIFF WBC: CPT

## 2023-09-24 PROCEDURE — 84439 ASSAY OF FREE THYROXINE: CPT

## 2023-09-24 PROCEDURE — 82306 VITAMIN D 25 HYDROXY: CPT

## 2023-09-24 PROCEDURE — 36415 COLL VENOUS BLD VENIPUNCTURE: CPT

## 2023-09-24 PROCEDURE — 84443 ASSAY THYROID STIM HORMONE: CPT

## 2023-09-24 PROCEDURE — 80061 LIPID PANEL: CPT

## 2023-09-24 PROCEDURE — 80053 COMPREHEN METABOLIC PANEL: CPT

## 2023-09-29 RX ORDER — ERGOCALCIFEROL 1.25 MG/1
CAPSULE ORAL
COMMUNITY
Start: 2023-08-08

## 2023-09-29 RX ORDER — BUPROPION HYDROCHLORIDE 75 MG/1
TABLET ORAL
COMMUNITY
Start: 2023-09-12 | End: 2023-10-02

## 2023-09-29 RX ORDER — BENZTROPINE MESYLATE 2 MG/1
2 TABLET ORAL EVERY MORNING
COMMUNITY
Start: 2023-09-06

## 2023-09-29 RX ORDER — ARIPIPRAZOLE 30 MG/1
30 TABLET ORAL
COMMUNITY
Start: 2023-09-06

## 2023-09-29 RX ORDER — ARIPIPRAZOLE 10 MG/1
TABLET ORAL
COMMUNITY
Start: 2023-08-05 | End: 2023-10-02

## 2023-09-29 RX ORDER — BUSPIRONE HYDROCHLORIDE 5 MG/1
TABLET ORAL
COMMUNITY
Start: 2023-09-12 | End: 2023-10-02

## 2023-09-29 RX ORDER — ARIPIPRAZOLE 20 MG/1
TABLET ORAL
COMMUNITY
Start: 2023-09-07 | End: 2023-10-02

## 2023-09-29 RX ORDER — ASCORBIC ACID 125 MG
1 TABLET,CHEWABLE ORAL DAILY
COMMUNITY
Start: 2023-09-26

## 2023-10-02 ENCOUNTER — OFFICE VISIT (OUTPATIENT)
Dept: FAMILY MEDICINE CLINIC | Facility: CLINIC | Age: 33
End: 2023-10-02
Payer: COMMERCIAL

## 2023-10-02 VITALS
DIASTOLIC BLOOD PRESSURE: 60 MMHG | HEART RATE: 68 BPM | WEIGHT: 159 LBS | HEIGHT: 64 IN | SYSTOLIC BLOOD PRESSURE: 80 MMHG | BODY MASS INDEX: 27.14 KG/M2 | OXYGEN SATURATION: 98 %

## 2023-10-02 DIAGNOSIS — E03.9 HYPOTHYROIDISM, UNSPECIFIED TYPE: ICD-10-CM

## 2023-10-02 DIAGNOSIS — M54.50 CHRONIC BILATERAL LOW BACK PAIN WITHOUT SCIATICA: Primary | ICD-10-CM

## 2023-10-02 DIAGNOSIS — Z12.4 SCREENING FOR CERVICAL CANCER: ICD-10-CM

## 2023-10-02 DIAGNOSIS — G89.29 CHRONIC BILATERAL LOW BACK PAIN WITHOUT SCIATICA: Primary | ICD-10-CM

## 2023-10-02 DIAGNOSIS — H53.8 BLURRY VISION, BILATERAL: ICD-10-CM

## 2023-10-02 PROBLEM — E07.9 DISEASE OF THYROID GLAND: Status: RESOLVED | Noted: 2017-10-16 | Resolved: 2023-10-02

## 2023-10-02 PROCEDURE — 99204 OFFICE O/P NEW MOD 45 MIN: CPT | Performed by: FAMILY MEDICINE

## 2023-10-02 RX ORDER — METHOCARBAMOL 500 MG/1
TABLET, FILM COATED ORAL
Qty: 30 TABLET | Refills: 1 | Status: SHIPPED | OUTPATIENT
Start: 2023-10-02

## 2023-10-02 NOTE — PROGRESS NOTES
Name: Nacho Cleaning      : 1990      MRN: [id number]  Encounter Provider: Krysta Palma MD  Encounter Date: 10/2/2023   Encounter department: [facility]    Assessment & Plan     1. Chronic bilateral low back pain without sciatica  Assessment & Plan:  rec  Home exercise  Program , if  Not getting  Better rec PT eval    Orders:  -     methocarbamol (ROBAXIN) 500 mg tablet; 1/2-1 po tid prn    2. Hypothyroidism, unspecified type    3. Blurry vision, bilateral  -     Ambulatory Referral to Ophthalmology; Future    4. Screening for cervical cancer  -     Ambulatory Referral to Gynecology; Future    5. BMI 27.0-27.9,adult         Subjective      Here as  New  Patient, , had  Muscle  Cramp  r  Leg but  Better  Now, sister  Aiding  In translation, sees  Endo for thyroid, sees psych for schizophrenia, is  Having spasms around eye , could be Abilify , also  Having back pain, worse  In am, sx on and  Off for  A couple  Months, no numbness or  Weakness of  Legs, no issues  With  Bowels  Or  Bladder  Other than occ  constipation    Review of Systems   Constitutional: Positive for unexpected weight change. Negative for activity change, appetite change and fatigue. Lost  A few  pounds   Eyes: Positive for visual disturbance. Eye movement issues   Respiratory: Negative for shortness of breath. Cardiovascular: Negative for chest pain. Musculoskeletal: Positive for back pain. Neurological: Negative for dizziness, weakness, light-headedness, numbness and headaches. Psychiatric/Behavioral: Negative for suicidal ideas.         Does hear voices-psych  appt  Not  Until        Current Outpatient Medications on File Prior to Visit   Medication Sig   • ARIPiprazole (ABILIFY) 30 mg tablet Take 30 mg by mouth daily at bedtime   • benztropine (COGENTIN) 2 mg tablet Take 2 mg by mouth every morning   • Cholecalciferol 125 MCG (5000 UT) capsule Take 5,000 Units by mouth daily   • ergocalciferol (VITAMIN D2) 50,000 units    • Magnesium Citrate 125 MG CAPS Take 1 capsule by mouth daily   • levothyroxine 112 mcg tablet Take 1 tablet by mouth daily in the early morning for 30 days   • [DISCONTINUED] ARIPiprazole (ABILIFY) 10 mg tablet  (Patient not taking: Reported on 10/2/2023)   • [DISCONTINUED] ARIPiprazole (ABILIFY) 20 MG tablet Take 1 tablet by mouth daily at bedtime for 60 days   • [DISCONTINUED] ARIPiprazole (ABILIFY) 20 MG tablet  (Patient not taking: Reported on 10/2/2023)   • [DISCONTINUED] buPROPion (WELLBUTRIN) 75 mg tablet  (Patient not taking: Reported on 10/2/2023)   • [DISCONTINUED] busPIRone (BUSPAR) 5 mg tablet  (Patient not taking: Reported on 10/2/2023)       Objective     BP (!) 80/60 (BP Location: Right arm, Patient Position: Sitting, Cuff Size: Standard)   Pulse 68   Ht 5' 4" (1.626 m)   Wt 72.1 kg (159 lb)   SpO2 98%   BMI 27.29 kg/m²     Physical Exam  Vitals reviewed. Constitutional:       Appearance: Normal appearance. Eyes:      Extraocular Movements: Extraocular movements intact. Pupils: Pupils are equal, round, and reactive to light. Comments: Fundi nl   Cardiovascular:      Rate and Rhythm: Normal rate and regular rhythm. Pulses: Normal pulses. Heart sounds: Normal heart sounds. Pulmonary:      Effort: Pulmonary effort is normal.      Breath sounds: Normal breath sounds. Musculoskeletal:         General: No tenderness. Comments: Nl rom, mild spasm noted   Lymphadenopathy:      Cervical: No cervical adenopathy. Neurological:      Mental Status: She is alert. Cranial Nerves: No cranial nerve deficit. Geoff Paulson MD  BMI Counseling: Body mass index is 27.29 kg/m². The BMI is above normal. Nutrition recommendations include reducing portion sizes, decreasing overall calorie intake, 3-5 servings of fruits/vegetables daily, reducing fast food intake and consuming healthier snacks.  Exercise recommendations include exercising 3-5 times per week.

## 2023-10-02 NOTE — PATIENT INSTRUCTIONS
Await eye  doctor eval , low  dose  muscle relaxant  Core Strengthening Exercises   WHAT YOU NEED TO KNOW:   Your core includes the muscles of your lower back, hip, pelvis, and abdomen. Core strengthening exercises help heal and strengthen these muscles. This helps prevent another injury, and keeps your pelvis, spine, and hips in the correct position. DISCHARGE INSTRUCTIONS:   Call your doctor or physical therapist if:   You have sharp or worsening pain during exercise or at rest.    You have questions or concerns about your shoulder exercises. Safety tips:  Talk to your healthcare provider before you start an exercise program. A physical therapist can teach you how to do core strengthening exercises safely. Do the exercises on a mat or firm surface. A firm surface will support your spine and prevent low back pain. Do not do these exercises on a bed. Move slowly and smoothly. Avoid fast or jerky motions. Stop if you feel pain. You may feel some discomfort at first, but you should not feel pain. Tell your provider or physical therapist if you have pain while you exercise. Regular exercise will help decrease your discomfort over time. Breathe normally during core exercises. Do not hold your breath. This may cause an increase in blood pressure and prevent muscle strengthening. Your healthcare provider will tell you when to inhale and exhale during the exercise. Begin all of your exercises with abdominal bracing. Abdominal bracing helps warm up your core muscles. You can also practice abdominal bracing throughout the day. Lie on your back with your knees bent and feet flat on the floor. Place your arms in a relaxed position beside your body. Tighten your abdominal muscles. Pull your belly button in and up toward your spine. Hold for 5 seconds. Relax your muscles. Repeat 10 times. Core strengthening exercises: Your healthcare provider will tell you how often to do these exercises.  The provider will also tell you how many repetitions of each exercise you should do. Hold each exercise for 5 seconds or as directed. As you get stronger, increase your hold to 10 to 15 seconds. You can do some of these exercises on a stability ball, or with a weight. Ask your healthcare provider how to use a stability ball or weight for these exercises:  Bridging:  Lie on your back with your knees bent and feet flat on the floor. Rest your arms at your side. Tighten your buttocks, and then lift your hips 1 inch off the floor. Hold for 5 seconds. When you can do this exercise without pain for 10 seconds, increase the distance you lift your hips. A good goal is to be able to lift your hips so that your shoulders, hips, and knees are in a straight line. Dead bug:  Lie on your back with your knees bent and feet flat on the floor. Place your arms in a relaxed position beside your body. Begin with abdominal bracing. Next, raise one leg, keeping your knee bent. Hold for 5 seconds. Repeat with the other leg. When you can do this exercise without pain for 10 to 15 seconds, you may raise one straight leg and hold. Repeat with the other leg. Quadruped:  Place your hands and knees on the floor. Keep your wrists directly below your shoulders and your knees directly below your hips. Pull your belly button in toward your spine. Do not flatten or arch your back. Tighten your abdominal muscles below your belly button. Hold for 5 seconds. When you can do this exercise without pain for 10 to 15 seconds, you may extend one arm and hold. Repeat on the other side. Side bridge exercises:      Standing side bridge:  Stand next to a wall and extend one arm toward the wall. Place your palm flat on the wall with your fingers pointing upward. Begin with abdominal bracing. Next, without moving your feet, slowly bend your arm to 90 degrees. Hold for 5 seconds. Repeat on the other side.  When you can do this exercise without pain for 10 to 15 seconds, you may do the bent leg side bridge on the floor. Bent leg side bridge:  Lie on one side with your legs, hips, and shoulders in a straight line. Prop yourself up onto your forearm so your elbow is directly below your shoulder. Bend your knees back to 90 degrees. Begin with abdominal bracing. Next, lift your hips and balance yourself on your forearm and knees. Hold for 5 seconds. Repeat on the other side. When you can do this exercise without pain for 10 to 15 seconds, you may do the straight leg side bridge on the floor. Straight leg side bridge:  Lie on one side with your legs, hips, and shoulders in a straight line. Prop yourself up onto your forearm so your elbow is directly below your shoulder. Begin with abdominal bracing. Lift your hips off the floor and balance yourself on your forearm and the outside of your flexed foot. Do not let your ankle bend sideways. Hold for 5 seconds. Repeat on the other side. When you can do this exercise without pain for 10 to 15 seconds, ask your healthcare provider for more advanced exercises. Superman:  Lie on your stomach. Extend your arms forward on the floor. Tighten your abdominal muscles and lift your right hand and left leg off the floor. Hold this position. Slowly return to the starting position. Tighten your abdominal muscles and lift your left hand and right leg off the floor. Hold this position. Slowly return to the starting position. Clam:  Lie on your side with your knees bent. Put your bottom arm under your head to keep your neck in line. Put your top hand on your hip to keep your pelvis from moving. Put your heels together, and keep them together during this exercise. Slowly raise your top knee toward the ceiling. Then lower your leg so your knees are together. Repeat this exercise 10 times. Then switch sides and do the exercise 10 times with the other leg.          Curl up:  Lie on your back with your knees bent and feet flat on the floor. Place your hands, palms down, underneath your lower back. Next, with your elbows on the floor, lift your shoulders and chest 2 to 3 inches off the floor. Keep your head in line with your shoulders. Hold this position. Slowly return to the starting position. Straight leg raises:  Lie on your back with one leg straight. Bend the other knee and place your foot flat on the floor. Tighten your abdominal muscles. Keep your leg straight and slowly lift it straight up 6 to 12 inches off the floor. Hold this position. Lower your leg slowly. Do as many repetitions as directed on this side. Repeat with the other leg. Plank:  Lie on your stomach. Bend your elbows and place your forearms flat on the floor. Lift your chest, stomach, and knees off the floor. Make sure your elbows are below your shoulders. Your body should be in a straight line. Do not let your hips or lower back sink to the ground. Squeeze your abdominal muscles together and hold for 15 seconds. To make this exercise harder, hold for 30 seconds or lift 1 leg at a time. Bicycles:  Lie on your back. Bend both knees and bring them toward your chest. Your calves should be parallel to the floor. Place the palms of your hands on the back of your head. Straighten your right leg and keep it lifted 2 inches off the floor. Raise your head and shoulders off the floor and twist towards your left. Keep your head and shoulders lifted. Bend your right knee while you straighten your left leg. Keep your left leg 2 inches off the floor. Twist your head and chest towards the left leg. Continue to straighten 1 leg at a time and twist.       Follow up with your doctor or physical therapist as directed:  Write down your questions so you remember to ask them during your visits. © Copyright Chuch[employer] 2023 Information is for End User's use only and may not be sold, redistributed or otherwise used for commercial purposes.   The above information is an  only. It is not intended as medical advice for individual conditions or treatments. Talk to your doctor, nurse or pharmacist before following any medical regimen to see if it is safe and effective for you. Lower Back Exercises   WHAT YOU NEED TO KNOW:   Lower back exercises help heal and strengthen your back muscles to prevent another injury. Ask your healthcare provider if you need to see a physical therapist for more advanced exercises. DISCHARGE INSTRUCTIONS:   Return to the emergency department if:   You have severe pain that prevents you from moving. Call your doctor if:   Your pain becomes worse. You have new pain. You have questions or concerns about your condition or care. Do lower back exercises safely:   Do the exercises on a mat or firm surface (not on a bed). A firm surface will support your spine and prevent low back pain. Move slowly and smoothly. Avoid fast or jerky motions. Breathe normally. Do not hold your breath. Stop if you feel pain. It is normal to feel some discomfort at first, but you should not feel pain. Regular exercise will help decrease your discomfort over time. Lower back exercises: Your healthcare provider may recommend that you do back exercises 10 to 30 minutes each day. He or she may also recommend that you do exercises 1 to 3 times each day. Ask your provider which exercises are best for you and how often to do them. Ankle pumps:  Lie on your back. Move your foot up (with your toes pointing toward your head). Then, move your foot down (with your toes pointing away from you). Repeat this exercise 10 times on each side. Heel slides:  Lie on your back. Slowly bend one leg and then straighten it. Next, bend the other leg and then straighten it. Repeat 10 times on each side. Pelvic tilt:  Lie on your back with your knees bent and feet flat on the floor.  Place your arms in a relaxed position beside your body. Tighten the muscles of your abdomen and flatten your back against the floor. Hold for 5 seconds. Repeat 5 times. Back stretch:  Lie on your back with your hands behind your head. Bend your knees and turn the lower half of your body to one side. Hold this position for 10 seconds. Repeat 3 times on each side. Straight leg raises:  Lie on your back with one leg straight. Bend the other knee. Tighten your abdomen and then slowly lift the straight leg up about 6 to 12 inches off the floor. Hold for 1 to 5 seconds. Lower your leg slowly. Repeat 10 times on each leg. Knee-to-chest:  Lie on your back with your knees bent and feet flat on the floor. Pull one of your knees toward your chest and hold it there for 5 seconds. Return your leg to the starting position. Lift the other knee toward your chest and hold for 5 seconds. Do this 5 times on each side. Cat and camel:  Place your hands and knees on the floor. Arch your back upward toward the ceiling and lower your head. Round out your spine as much as you can. Hold for 5 seconds. Lift your head upward and push your chest downward toward the floor. Hold for 5 seconds. Do 3 sets or as directed. Wall squats:  Stand with your back against a wall. Tighten the muscles of your abdomen. Slowly lower your body until your knees are bent at a 45 degree angle. Hold this position for 5 seconds. Slowly move back up to a standing position. Repeat 10 times. Curl up:  Lie on your back with your knees bent and feet flat on the floor. Place your hands, palms down, underneath the curve in your lower back. Next, with your elbows on the floor, lift your shoulders and chest 2 to 3 inches. Keep your head in line with your shoulders. Hold this position for 5 seconds. When you can do this exercise without pain for 10 to 15 seconds, you may add a rotation.  While your shoulders and chest are lifted off the ground, turn slightly to the left and hold. Repeat on the other side. Bird dog:  Place your hands and knees on the floor. Keep your wrists directly below your shoulders and your knees directly below your hips. Pull your belly button in toward your spine. Do not flatten or arch your back. Tighten your abdominal muscles. Raise one arm straight out so that it is aligned with your head. Next, raise the leg opposite your arm. Hold this position for 15 seconds. Lower your arm and leg slowly and change sides. Do 5 sets. Follow up with your doctor as directed:  Write down your questions so you remember to ask them during your visits. © Copyright Agnes Short 2023 Information is for End User's use only and may not be sold, redistributed or otherwise used for commercial purposes. The above information is an  only. It is not intended as medical advice for individual conditions or treatments. Talk to your doctor, nurse or pharmacist before following any medical regimen to see if it is safe and effective for you. Weight Management   AMBULATORY CARE:   Why it is important to manage your weight:  Being overweight increases your risk of health conditions such as heart disease, high blood pressure, type 2 diabetes, and certain types of cancer. It can also increase your risk for osteoarthritis, sleep apnea, and other respiratory problems. Aim for a slow, steady weight loss. Even a small amount of weight loss can lower your risk of health problems. Risks of being overweight:  Extra weight can cause many health problems, including the following:  Diabetes (high blood sugar level)    High blood pressure or high cholesterol    Heart disease    Stroke    Gallbladder or liver disease    Cancer of the colon, breast, prostate, liver, or kidney    Sleep apnea    Arthritis or gout    Screening  is done to check for health conditions before you have signs or symptoms.  If you are 28to 79years old, your blood sugar level may be checked every 3 years for signs of prediabetes or diabetes. Your healthcare provider will check your blood pressure at each visit. High blood pressure can lead to a stroke or other problems. Your provider may check for signs of heart disease, cancer, or other health problems. How to lose weight safely:  A safe and healthy way to lose weight is to eat fewer calories and get regular exercise. You can lose up about 1 pound a week by decreasing the number of calories you eat by 500 calories each day. You can decrease calories by eating smaller portion sizes or by cutting out high-calorie foods. Read labels to find out how many calories are in the foods you eat. You can also burn calories with exercise such as walking, swimming, or biking. You will be more likely to keep weight off if you make these changes part of your lifestyle. Exercise at least 30 minutes per day on most days of the week. You can also fit in more physical activity by taking the stairs instead of the elevator or parking farther away from stores. Ask your healthcare provider about the best exercise plan for you. Healthy meal plan for weight management:  A healthy meal plan includes a variety of foods, contains fewer calories, and helps you stay healthy. A healthy meal plan includes the following:     Eat whole-grain foods more often. A healthy meal plan should contain fiber. Fiber is the part of grains, fruits, and vegetables that is not broken down by your body. Whole-grain foods are healthy and provide extra fiber in your diet. Some examples of whole-grain foods are whole-wheat breads and pastas, oatmeal, brown rice, and bulgur. Eat a variety of vegetables every day. Include dark, leafy greens such as spinach, kale, heather greens, and mustard greens. Eat yellow and orange vegetables such as carrots, sweet potatoes, and winter squash. Eat a variety of fruits every day.   Choose fresh or canned fruit (canned in its own juice or light syrup) instead of juice. Fruit juice has very little or no fiber. Eat low-fat dairy foods. Drink fat-free (skim) milk or 1% milk. Eat fat-free yogurt and low-fat cottage cheese. Try low-fat cheeses such as mozzarella and other reduced-fat cheeses. Choose meat and other protein foods that are low in fat. Choose beans or other legumes such as split peas or lentils. Choose fish, skinless poultry (chicken or turkey), or lean cuts of red meat (beef or pork). Before you cook meat or poultry, cut off any visible fat. Use less fat and oil. Try baking foods instead of frying them. Add less fat, such as margarine, sour cream, regular salad dressing and mayonnaise to foods. Eat fewer high-fat foods. Some examples of high-fat foods include french fries, doughnuts, ice cream, and cakes. Eat fewer sweets. Limit foods and drinks that are high in sugar. This includes candy, cookies, regular soda, and sweetened drinks. Ways to decrease calories:   Eat smaller portions. Use a small plate with smaller servings. Do not eat second helpings. When you eat at a restaurant, ask for a box and place half of your meal in the box before you eat. Share an entrée with someone else. Replace high-calorie snacks with healthy, low-calorie snacks. Choose fresh fruit, vegetables, fat-free rice cakes, or air-popped popcorn instead of potato chips, nuts, or chocolate. Choose water or calorie-free drinks instead of soda or sweetened drinks. Do not shop for groceries when you are hungry. You may be more likely to make unhealthy food choices. Take a grocery list of healthy foods and shop after you have eaten. Eat regular meals. Do not skip meals. Skipping meals can lead to overeating later in the day. This can make it harder for you to lose weight.  Eat a healthy snack in place of a meal if you do not have time to eat a regular meal. Talk with a dietitian to help you create a meal plan and schedule that is right for you.    Other things to consider as you try to lose weight:   Be aware of situations that may give you the urge to overeat, such as eating while watching television. Find ways to avoid these situations. For example, read a book, go for a walk, or do crafts. Meet with a weight loss support group or friends who are also trying to lose weight. This may help you stay motivated to continue working on your weight loss goals. © Copyright Savita Staley 2023 Information is for End User's use only and may not be sold, redistributed or otherwise used for commercial purposes. The above information is an  only. It is not intended as medical advice for individual conditions or treatments. Talk to your doctor, nurse or pharmacist before following any medical regimen to see if it is safe and effective for you.

## 2023-10-09 ENCOUNTER — OFFICE VISIT (OUTPATIENT)
Dept: OBGYN CLINIC | Facility: CLINIC | Age: 33
End: 2023-10-09
Payer: COMMERCIAL

## 2023-10-09 VITALS
SYSTOLIC BLOOD PRESSURE: 112 MMHG | DIASTOLIC BLOOD PRESSURE: 52 MMHG | BODY MASS INDEX: 27.25 KG/M2 | WEIGHT: 159.6 LBS | HEIGHT: 64 IN

## 2023-10-09 DIAGNOSIS — Z01.419 WELL WOMAN EXAM WITH ROUTINE GYNECOLOGICAL EXAM: Primary | ICD-10-CM

## 2023-10-09 DIAGNOSIS — Z72.3 INADEQUATE EXERCISE: ICD-10-CM

## 2023-10-09 PROCEDURE — 99385 PREV VISIT NEW AGE 18-39: CPT | Performed by: OBSTETRICS & GYNECOLOGY

## 2023-10-09 NOTE — PROGRESS NOTES
Belkys Talley is a 35 y.o. female who presents for annual well woman exam.     GYN:   No vaginal discharge, labial erythema or lesions, dyspareunia. Menarche at 15. LMP: unsure   Menses are regular, q 28-30 days, lasting 5 days. Contraception: none. Patient is not sexually active    HPV vaccination status: not vaccinated   Gynecologic surgeries: none    OB:    female      :   No dysuria, urinary frequency or urgency. No hematuria, flank pain, incontinence. Breast:   No breast mass, skin changes, dimpling, reddening, nipple retraction. No breast discharge. Patient does not have a family history of breast, endometrial, or ovarian ca. General:   Exercise: walking    ETOH use: none   Tobacco use: none   Recreational drug use: none    Screening:   Cervical cancer: no prior screening   STD screening: no prior screening    Review of Systems  Pertinent items are noted in HPI. Objective      /52 (BP Location: Left arm, Patient Position: Sitting, Cuff Size: Standard)   Ht 5' 4" (1.626 m)   Wt 72.4 kg (159 lb 9.6 oz)   LMP  (Exact Date)   BMI 27.40 kg/m²     Physical Exam  Constitutional:       Appearance: Normal appearance. Genitourinary:      Right Labia: No rash, tenderness or lesions. Left Labia: No tenderness, lesions or rash. Vaginal exam comments: Internal exam not performed due to pt discomfort. Breasts:     Right: Normal.      Left: Normal.   Cardiovascular:      Rate and Rhythm: Normal rate. Pulmonary:      Effort: Pulmonary effort is normal.   Abdominal:      Palpations: Abdomen is soft. Musculoskeletal:         General: No swelling or tenderness. Neurological:      Mental Status: She is alert and oriented to person, place, and time. Skin:     General: Skin is warm and dry. Symone William is a 35 y.o. Sharona Tad with normal breast exam and incomplete pelvic exam      Plan     1. Routine well woman exam done today.   2. Pap and HPV:Pap with HPV was not done today as patient has never been sexually active and is uncomfortable with internal exam. Discussed returning for pap smear when pt becomes sexually active. Current ASCCP Guidelines reviewed. 3.  The patient declined STD testing. 4. The patient is not sexually active. 5. The following were reviewed in today's visit: breast self exam and exercise (recommend 150min/week).   6. Patient to return to office in 12 months for annual exam.

## 2023-11-13 NOTE — PROGRESS NOTES
Refill Decision Note   Loni Heard  is requesting a refill authorization.  Brief Assessment and Rationale for Refill:  Approve     Medication Therapy Plan:         Comments:     Note composed:3:59 PM 11/13/2023            Pt pleasant and bright  Denies AVH at this time  No n/v after Kiarra  Reads in room and socializes with U[place]bek speaking peers  Compliant with care  Will monitor

## 2024-05-20 ENCOUNTER — OFFICE VISIT (OUTPATIENT)
Dept: FAMILY MEDICINE CLINIC | Facility: CLINIC | Age: 34
End: 2024-05-20
Payer: COMMERCIAL

## 2024-05-20 ENCOUNTER — VBI (OUTPATIENT)
Dept: ADMINISTRATIVE | Facility: OTHER | Age: 34
End: 2024-05-20

## 2024-05-20 VITALS
OXYGEN SATURATION: 98 % | SYSTOLIC BLOOD PRESSURE: 90 MMHG | DIASTOLIC BLOOD PRESSURE: 60 MMHG | HEART RATE: 78 BPM | BODY MASS INDEX: 26.72 KG/M2 | HEIGHT: 64 IN | WEIGHT: 156.5 LBS

## 2024-05-20 DIAGNOSIS — E03.9 HYPOTHYROIDISM, UNSPECIFIED TYPE: ICD-10-CM

## 2024-05-20 DIAGNOSIS — F25.0 SCHIZOAFFECTIVE DISORDER, BIPOLAR TYPE (HCC): Chronic | ICD-10-CM

## 2024-05-20 DIAGNOSIS — E66.3 OVERWEIGHT (BMI 25.0-29.9): Primary | ICD-10-CM

## 2024-05-20 PROCEDURE — 99214 OFFICE O/P EST MOD 30 MIN: CPT | Performed by: PHYSICIAN ASSISTANT

## 2024-05-20 NOTE — PROGRESS NOTES
Ambulatory Visit  Name: Ngoc Roe      : 1990      MRN: [id number]  Encounter Provider: Santos Felton PA-C  Encounter Date: 2024   Encounter department: [facility]  Patient Instructions   Assessment/plan:  1.  Overweight-BMI of 26.8-would like recommend weight loss of about 10 pounds.  Patient is interested in seeing weight management/nutritionist through [facility].  Order placed.  2.  Schizoaffective disorder-bipolar type, stable per psychiatry, currently on Cogentin and Abilify therapy.  3.  Hypothyroidism-stable per endocrinology on levothyroxine 112 mcg.  Patient did have recent labs within the past 2 months which were stable.  4.  Eczema of the ears-patient is complaining of some itching.  There is no sign of fluid or erythema or wax buildup.  Recommend trial of a small amount of hydrocortisone cream around the canal as necessary 1-2 times daily.  Patient Instructions   Assessment/plan:  1.  Overweight-BMI of 26.8-would like recommend weight loss of about 10 pounds.  Patient is interested in seeing weight management/nutritionist through [facility].  Order placed.  2.  Schizoaffective disorder-bipolar type, stable per psychiatry, currently on Cogentin and Abilify therapy.  3.  Hypothyroidism-stable per endocrinology on levothyroxine 112 mcg.  Patient did have recent labs within the past 2 months which were stable.  4.  Eczema of the ears-patient is complaining of some itching.  There is no sign of fluid or erythema or wax buildup.  Recommend trial of a small amount of hydrocortisone cream around the canal as necessary 1-2 times daily.    Assessment & Plan   1. Overweight (BMI 25.0-29.9)  -     Ambulatory Referral to Weight Management; Future  2. Schizoaffective disorder, bipolar type (HCC)  3. Hypothyroidism, unspecified type       History of Present Illness   {Disappearing Hyperlinks I Encounters * My Last Note * Since Last Visit * History  ":95101}  HPI: This is a 34-year-old female that presents to the office accompanied by her parents.  She is seen for concern about her weight.  She does have a history of hypothyroidism but this has been stable with endocrinology.  She feels that she has gained a few pounds and has been exercising at least 3 times per week and eating a healthy diet and has not been able to lose weight.  She is interested in seeing a nutritionist and receiving some further weight management counseling.  She is interested in doing this through [facility] but needs in order to do so.  She does have history of schizoaffective disorder and continues on Abilify and Cogentin therapy.  She also is on 112 mcg of levothyroxine through endocrinology.  She does complain that her ears itch a little bit.        Review of Systems   Constitutional:  Negative for chills, fatigue and fever.   HENT:  Negative for congestion, ear pain and sinus pressure.    Eyes:  Negative for visual disturbance.   Respiratory:  Negative for cough, chest tightness and shortness of breath.    Cardiovascular:  Negative for chest pain and palpitations.   Gastrointestinal:  Negative for diarrhea, nausea and vomiting.   Endocrine: Negative for polyuria.   Genitourinary:  Negative for dysuria and frequency.   Musculoskeletal:  Negative for arthralgias and myalgias.   Skin:  Negative for pallor and rash.   Neurological:  Negative for dizziness, weakness, light-headedness, numbness and headaches.   Psychiatric/Behavioral:  Negative for agitation, behavioral problems and sleep disturbance.    All other systems reviewed and are negative.      Objective   {Disappearing Hyperlinks   Review Vitals * Enter New Vitals * Results Review * Labs * Imaging * Cardiology * Procedures * Lung Cancer Screening :[id number]}  BP 90/60 (BP Location: Right arm, Patient Position: Sitting, Cuff Size: Standard)   Pulse 78   Ht 5' 4\" (1.626 m)   Wt 71 kg (156 lb 8 oz)   SpO2 98%   " BMI 26.86 kg/m²     Physical Exam  Constitutional:       General: She is not in acute distress.     Appearance: Normal appearance.   HENT:      Head: Normocephalic and atraumatic.      Right Ear: Tympanic membrane normal.      Left Ear: Tympanic membrane normal.      Nose: No congestion or rhinorrhea.   Eyes:      Conjunctiva/sclera: Conjunctivae normal.      Pupils: Pupils are equal, round, and reactive to light.   Neck:      Vascular: No carotid bruit.   Cardiovascular:      Rate and Rhythm: Normal rate and regular rhythm.      Heart sounds: No murmur heard.  Pulmonary:      Effort: Pulmonary effort is normal. No respiratory distress.      Breath sounds: Normal breath sounds.   Abdominal:      Palpations: Abdomen is soft.   Musculoskeletal:         General: Normal range of motion.      Cervical back: Normal range of motion and neck supple. No muscular tenderness.   Lymphadenopathy:      Cervical: No cervical adenopathy.   Skin:     General: Skin is warm.      Capillary Refill: Capillary refill takes less than 2 seconds.   Neurological:      General: No focal deficit present.      Mental Status: She is alert and oriented to person, place, and time.   Psychiatric:         Mood and Affect: Mood normal.       Administrative Statements {Disappearing Hyperlinks I  Level of Service * [facility]/PCSP:[id number]}

## 2024-05-20 NOTE — PATIENT INSTRUCTIONS
Assessment/plan:  1.  Overweight-BMI of 26.8-would like recommend weight loss of about 10 pounds.  Patient is interested in seeing weight management/nutritionist through [facility].  Order placed.  2.  Schizoaffective disorder-bipolar type, stable per psychiatry, currently on Cogentin and Abilify therapy.  3.  Hypothyroidism-stable per endocrinology on levothyroxine 112 mcg.  Patient did have recent labs within the past 2 months which were stable.  4.  Eczema of the ears-patient is complaining of some itching.  There is no sign of fluid or erythema or wax buildup.  Recommend trial of a small amount of hydrocortisone cream around the canal as necessary 1-2 times daily.

## 2024-05-31 ENCOUNTER — TELEPHONE (OUTPATIENT)
Dept: FAMILY MEDICINE CLINIC | Facility: CLINIC | Age: 34
End: 2024-05-31

## 2024-10-14 ENCOUNTER — OFFICE VISIT (OUTPATIENT)
Dept: FAMILY MEDICINE CLINIC | Facility: CLINIC | Age: 34
End: 2024-10-14
Payer: COMMERCIAL

## 2024-10-14 VITALS
SYSTOLIC BLOOD PRESSURE: 90 MMHG | OXYGEN SATURATION: 100 % | HEART RATE: 70 BPM | TEMPERATURE: 97.6 F | DIASTOLIC BLOOD PRESSURE: 70 MMHG | HEIGHT: 64 IN | WEIGHT: 150 LBS | BODY MASS INDEX: 25.61 KG/M2

## 2024-10-14 DIAGNOSIS — R51.9 HEADACHE, TEMPORAL: Primary | ICD-10-CM

## 2024-10-14 PROCEDURE — 99214 OFFICE O/P EST MOD 30 MIN: CPT | Performed by: FAMILY MEDICINE

## 2024-10-14 PROCEDURE — 99173 VISUAL ACUITY SCREEN: CPT | Performed by: FAMILY MEDICINE

## 2024-10-14 RX ORDER — NAPROXEN 500 MG/1
500 TABLET ORAL 2 TIMES DAILY WITH MEALS
Qty: 60 TABLET | Refills: 1 | Status: SHIPPED | OUTPATIENT
Start: 2024-10-14

## 2024-10-14 NOTE — PROGRESS NOTES
Assessment/Plan:    Headache, temporal  Check vision if  nl  check sed rate and  ct  head       Diagnoses and all orders for this visit:    Headache, temporal  -     Visual acuity screening  -     naproxen (Naprosyn) 500 mg tablet; Take 1 tablet (500 mg total) by mouth 2 (two) times a day with meals  -     C-reactive protein  -     CT head wo contrast; Future          Subjective:      Patient ID: Ngoc Roe is a 34 y.o. female.    Patient presents with:  Headache: Per pt since 2 weeks ago on and off, worse at night, no sinus pain, some visual issues with seeing tv, no recent  vision exam, no n/v, no numbness or weakness, no dizziness, no weight loss or   appetite change       Headache  Headache pattern:  Headache sometimes there, sometimes not at all  Initial event:  None  Recent changes:  Headaches come more often than they used to  Frequency:  More than 3 per week  Providers seen:  None  Age of onset (years):  34  Number of ER visits for headache:  0  Number of hospitalizations for headaches:  0  Time of day symptoms are worse:  Night  Do headaches wake patient from sleep?: No        The following portions of the patient's history were reviewed and updated as appropriate: allergies, current medications, past family history, past medical history, past social history, past surgical history, and problem list.    Review of Systems   Constitutional:  Positive for fatigue. Negative for activity change and appetite change.   HENT:  Negative for congestion, ear pain, sinus pressure and sinus pain.    Eyes:  Positive for visual disturbance.   Genitourinary:  Negative for menstrual problem and vaginal bleeding.   Neurological:  Positive for headaches. Negative for dizziness and light-headedness.   Psychiatric/Behavioral:  Positive for sleep disturbance.         Early  morning awakening         Objective:      BP 90/70 (BP Location: Right arm, Patient Position: Sitting, Cuff Size: Adult)   Pulse 70   Temp 97.6 °F (36.4 °C)  "(Tympanic)   Ht 5' 4\" (1.626 m)   Wt 68 kg (150 lb)   SpO2 100%   BMI 25.75 kg/m²          Physical Exam  Vitals reviewed.   Constitutional:       Appearance: Normal appearance.   HENT:      Nose: No congestion or rhinorrhea.      Mouth/Throat:      Pharynx: No oropharyngeal exudate or posterior oropharyngeal erythema.   Eyes:      General:         Right eye: No discharge.         Left eye: No discharge.   Cardiovascular:      Rate and Rhythm: Normal rate and regular rhythm.   Lymphadenopathy:      Cervical: No cervical adenopathy.   Neurological:      Mental Status: She is alert and oriented to person, place, and time.      Cranial Nerves: No cranial nerve deficit.      Sensory: No sensory deficit.      Motor: No weakness.      Coordination: Coordination normal.      Gait: Gait normal.      Deep Tendon Reflexes: Reflexes normal.   Psychiatric:         Mood and Affect: Mood normal.            "

## 2024-11-05 LAB — CRP SERPL-MCNC: <3 MG/L

## 2025-01-11 ENCOUNTER — HOSPITAL ENCOUNTER (OUTPATIENT)
Dept: CT IMAGING | Facility: HOSPITAL | Age: 35
Discharge: HOME | End: 2025-01-11
Payer: COMMERCIAL

## 2025-01-11 DIAGNOSIS — R41.82 CHANGE IN MENTAL STATUS: ICD-10-CM

## 2025-01-11 DIAGNOSIS — R44.3 HALLUCINATION: ICD-10-CM

## 2025-01-11 PROCEDURE — 70450 CT HEAD/BRAIN W/O DYE: CPT

## 2025-01-13 ENCOUNTER — RESULTS FOLLOW-UP (OUTPATIENT)
Dept: FAMILY MEDICINE CLINIC | Facility: CLINIC | Age: 35
End: 2025-01-13

## 2025-04-16 ENCOUNTER — VBI (OUTPATIENT)
Dept: ADMINISTRATIVE | Facility: OTHER | Age: 35
End: 2025-04-16

## 2025-04-16 NOTE — TELEPHONE ENCOUNTER
04/16/25 3:59 PM     Chart reviewed for Pap Smear (HPV) aka Cervical Cancer Screening ; nothing is submitted to the patient's insurance at this time.     Iveth Aquino MA   [facility]

## 2025-06-28 ENCOUNTER — HOSPITAL ENCOUNTER (EMERGENCY)
Facility: HOSPITAL | Age: 35
End: 2025-06-29
Attending: EMERGENCY MEDICINE | Admitting: EMERGENCY MEDICINE
Payer: COMMERCIAL

## 2025-06-28 DIAGNOSIS — F20.9 SCHIZOPHRENIA (HCC): ICD-10-CM

## 2025-06-28 DIAGNOSIS — F22 PARANOIA (HCC): Primary | ICD-10-CM

## 2025-06-28 LAB
AMPHETAMINES SERPL QL SCN: NEGATIVE
ANION GAP SERPL CALCULATED.3IONS-SCNC: 12 MMOL/L (ref 4–13)
BARBITURATES UR QL: NEGATIVE
BASOPHILS # BLD AUTO: 0.05 THOUSANDS/ÂΜL (ref 0–0.1)
BASOPHILS NFR BLD AUTO: 1 % (ref 0–1)
BENZODIAZ UR QL: NEGATIVE
BUN SERPL-MCNC: 10 MG/DL (ref 5–25)
CALCIUM SERPL-MCNC: 8.8 MG/DL (ref 8.4–10.2)
CHLORIDE SERPL-SCNC: 96 MMOL/L (ref 96–108)
CO2 SERPL-SCNC: 20 MMOL/L (ref 21–32)
COCAINE UR QL: NEGATIVE
CREAT SERPL-MCNC: 0.73 MG/DL (ref 0.6–1.3)
EOSINOPHIL # BLD AUTO: 0 THOUSAND/ÂΜL (ref 0–0.61)
EOSINOPHIL NFR BLD AUTO: 0 % (ref 0–6)
ERYTHROCYTE [DISTWIDTH] IN BLOOD BY AUTOMATED COUNT: 11.3 % (ref 11.6–15.1)
ETHANOL EXG-MCNC: 0 MG/DL
EXT PREGNANCY TEST URINE: NEGATIVE
EXT. CONTROL: NORMAL
FENTANYL UR QL SCN: NEGATIVE
GFR SERPL CREATININE-BSD FRML MDRD: 107 ML/MIN/1.73SQ M
GLUCOSE SERPL-MCNC: 159 MG/DL (ref 65–140)
HCT VFR BLD AUTO: 36.9 % (ref 34.8–46.1)
HGB BLD-MCNC: 12.5 G/DL (ref 11.5–15.4)
HYDROCODONE UR QL SCN: NEGATIVE
IMM GRANULOCYTES # BLD AUTO: 0.02 THOUSAND/UL (ref 0–0.2)
IMM GRANULOCYTES NFR BLD AUTO: 0 % (ref 0–2)
LYMPHOCYTES # BLD AUTO: 1.75 THOUSANDS/ÂΜL (ref 0.6–4.47)
LYMPHOCYTES NFR BLD AUTO: 22 % (ref 14–44)
MCH RBC QN AUTO: 28.2 PG (ref 26.8–34.3)
MCHC RBC AUTO-ENTMCNC: 33.9 G/DL (ref 31.4–37.4)
MCV RBC AUTO: 83 FL (ref 82–98)
METHADONE UR QL: NEGATIVE
MONOCYTES # BLD AUTO: 0.4 THOUSAND/ÂΜL (ref 0.17–1.22)
MONOCYTES NFR BLD AUTO: 5 % (ref 4–12)
NEUTROPHILS # BLD AUTO: 5.71 THOUSANDS/ÂΜL (ref 1.85–7.62)
NEUTS SEG NFR BLD AUTO: 72 % (ref 43–75)
NRBC BLD AUTO-RTO: 0 /100 WBCS
OPIATES UR QL SCN: NEGATIVE
OXYCODONE+OXYMORPHONE UR QL SCN: NEGATIVE
PCP UR QL: NEGATIVE
PLATELET # BLD AUTO: 276 THOUSANDS/UL (ref 149–390)
PMV BLD AUTO: 10.2 FL (ref 8.9–12.7)
POTASSIUM SERPL-SCNC: 3 MMOL/L (ref 3.5–5.3)
RBC # BLD AUTO: 4.43 MILLION/UL (ref 3.81–5.12)
SODIUM SERPL-SCNC: 128 MMOL/L (ref 135–147)
THC UR QL: NEGATIVE
WBC # BLD AUTO: 7.93 THOUSAND/UL (ref 4.31–10.16)

## 2025-06-28 PROCEDURE — 80307 DRUG TEST PRSMV CHEM ANLYZR: CPT

## 2025-06-28 PROCEDURE — 81025 URINE PREGNANCY TEST: CPT

## 2025-06-28 PROCEDURE — 82075 ASSAY OF BREATH ETHANOL: CPT

## 2025-06-28 PROCEDURE — 96361 HYDRATE IV INFUSION ADD-ON: CPT

## 2025-06-28 PROCEDURE — 99285 EMERGENCY DEPT VISIT HI MDM: CPT

## 2025-06-28 PROCEDURE — 36415 COLL VENOUS BLD VENIPUNCTURE: CPT

## 2025-06-28 PROCEDURE — 80048 BASIC METABOLIC PNL TOTAL CA: CPT

## 2025-06-28 PROCEDURE — 96360 HYDRATION IV INFUSION INIT: CPT

## 2025-06-28 PROCEDURE — 85025 COMPLETE CBC W/AUTO DIFF WBC: CPT

## 2025-06-28 RX ORDER — POTASSIUM CHLORIDE 1500 MG/1
40 TABLET, EXTENDED RELEASE ORAL ONCE
Status: COMPLETED | OUTPATIENT
Start: 2025-06-28 | End: 2025-06-28

## 2025-06-28 RX ORDER — LORAZEPAM 1 MG/1
2 TABLET ORAL ONCE
Status: COMPLETED | OUTPATIENT
Start: 2025-06-28 | End: 2025-06-28

## 2025-06-28 RX ORDER — LEVOTHYROXINE SODIUM 112 UG/1
112 TABLET ORAL
Status: CANCELLED | OUTPATIENT
Start: 2025-06-29

## 2025-06-28 RX ADMIN — LORAZEPAM 2 MG: 1 TABLET ORAL at 15:39

## 2025-06-28 RX ADMIN — POTASSIUM CHLORIDE 40 MEQ: 1500 TABLET, EXTENDED RELEASE ORAL at 17:29

## 2025-06-28 RX ADMIN — SODIUM CHLORIDE 1000 ML: 0.9 INJECTION, SOLUTION INTRAVENOUS at 17:10

## 2025-06-28 NOTE — ED PROVIDER NOTES
Time reflects when diagnosis was documented in both MDM as applicable and the Disposition within this note       Time User Action Codes Description Comment    6/28/2025  4:20 PM Jose Siddiqui Add [F22] Paranoia (HCC)           ED Disposition       None          Assessment & Plan       Medical Decision Making  Blood work performed due to potential of having to go out of network for a  bed.     Signed out to Clara Coronel PA-C: once pt receives 1L of fluids and potassium she will be medically cleared. Has agreed to sign herself in. However if pt wants to leave should get psych consult. Should not have visitors as father and sister seem to be a trigger.       Amount and/or Complexity of Data Reviewed  Labs: ordered. Decision-making details documented in ED Course.    Risk  Prescription drug management.        ED Course as of 06/28/25 1704   Sat Jun 28, 2025   1555 Per crisis worker Mel cardoso in network beds available so will likely need blood work to go outpatient. Will order CBC and BMP. Pt has a history of schizophrenia and paranoia   1619 Sodium(!): 128  Will give 1L NS   1619 Potassium(!): 3.0  Will give oral K   1659 S/O to JENARO PIKE: paranoia, agreeable to sign 201 but labs obtained due to potential of going out of network. Showed hypoK and hypoNa. Once fluids are done and given potassium then medically cleared to be seen by crisis.        Medications   potassium chloride (Klor-Con M20) CR tablet 40 mEq (has no administration in time range)   sodium chloride 0.9 % bolus 1,000 mL (has no administration in time range)   LORazepam (ATIVAN) tablet 2 mg (2 mg Oral Given 6/28/25 1539)       ED Risk Strat Scores                    No data recorded                            History of Present Illness       Chief Complaint   Patient presents with    Paranoia     Pt comes in through ER waiting room screaming and violent. Stating her father is trying to kill her and she's hearing voices.        Past Medical History[1]    Past Surgical History[2]   Family History[3]   Social History[4]   E-Cigarette/Vaping    E-Cigarette Use Never User       E-Cigarette/Vaping Substances    Nicotine No     THC No     CBD No     Flavoring No     Other No     Unknown No       I have reviewed and agree with the history as documented.     35 YOF with PMH hypothyroidism and schizophrenia presents today from waiting room. Per triage pt came in with her sister and father yelling and screaming that they were trying to kill her. Once in the room by herself she was calm. Tells me that she thinks she is having a crisis but states that her father and sister are trying to hurt her and she is scared of them. Pt reports she does occasionally hear voices, unable to tell me what they are saying. Denies SI, HI. Tells me she has been taking her medication as prescribed and saw her psychiatrist yesterday. Has psychiatrist and therapist through [facility].     Spoke with father and sister- report that she has not been taking her medication for at least 1 week. State that she is having a crisis. Follows with [facility]        Review of Systems        Objective       ED Triage Vitals [06/28/25 1523]   Temperature Pulse Blood Pressure Respirations SpO2 Patient Position - Orthostatic VS   98.2 °F (36.8 °C) (!) 132 (!) 185/86 20 100 % Lying      Temp Source Heart Rate Source BP Location FiO2 (%) Pain Score    Oral Monitor Right arm -- --      Vitals      Date and Time Temp Pulse SpO2 Resp BP Pain Score FACES Pain Rating User   06/28/25 1541 -- 110 100 % 18 141/72 -- -- AMB   06/28/25 1523 98.2 °F (36.8 °C) 132 100 % 20 185/86 -- -- JS            Physical Exam    Results Reviewed       Procedure Component Value Units Date/Time    Basic metabolic panel [[id number]]  (Abnormal) Collected: 06/28/25 1600    Lab Status: Final result Specimen: Blood from Arm, Right Updated: 06/28/25 1619     Sodium 128 mmol/L      Potassium 3.0 mmol/L      Chloride 96 mmol/L      CO2 20 mmol/L      ANION GAP 12  mmol/L      BUN 10 mg/dL      Creatinine 0.73 mg/dL      Glucose 159 mg/dL      Calcium 8.8 mg/dL      eGFR 107 ml/min/1.73sq m     Narrative:      National Kidney Disease Foundation guidelines for Chronic Kidney Disease (CKD):     Stage 1 with normal or high GFR (GFR > 90 mL/min/1.73 square meters)    Stage 2 Mild CKD (GFR = 60-89 mL/min/1.73 square meters)    Stage 3A Moderate CKD (GFR = 45-59 mL/min/1.73 square meters)    Stage 3B Moderate CKD (GFR = 30-44 mL/min/1.73 square meters)    Stage 4 Severe CKD (GFR = 15-29 mL/min/1.73 square meters)    Stage 5 End Stage CKD (GFR <15 mL/min/1.73 square meters)  Note: GFR calculation is accurate only with a steady state creatinine    CBC and differential [[id number]]  (Abnormal) Collected: 06/28/25 1600    Lab Status: Final result Specimen: Blood from Arm, Right Updated: 06/28/25 1605     WBC 7.93 Thousand/uL      RBC 4.43 Million/uL      Hemoglobin 12.5 g/dL      Hematocrit 36.9 %      MCV 83 fL      MCH 28.2 pg      MCHC 33.9 g/dL      RDW 11.3 %      MPV 10.2 fL      Platelets 276 Thousands/uL      nRBC 0 /100 WBCs      Segmented % 72 %      Immature Grans % 0 %      Lymphocytes % 22 %      Monocytes % 5 %      Eosinophils Relative 0 %      Basophils Relative 1 %      Absolute Neutrophils 5.71 Thousands/µL      Absolute Immature Grans 0.02 Thousand/uL      Absolute Lymphocytes 1.75 Thousands/µL      Absolute Monocytes 0.40 Thousand/µL      Eosinophils Absolute 0.00 Thousand/µL      Basophils Absolute 0.05 Thousands/µL     POCT alcohol breath test [[id number]]  (Normal) Collected: 06/28/25 1554    Lab Status: Final result Updated: 06/28/25 1554     EXTBreath Alcohol 0.00    Rapid drug screen, urine [[id number]]     Lab Status: No result Specimen: Urine     POCT pregnancy, urine [[id number]]     Lab Status: No result Specimen: Urine             No orders to display       Procedures    ED Medication and Procedure Management   Prior to Admission Medications   Prescriptions  Last Dose Informant Patient Reported? Taking?   ARIPiprazole (ABILIFY) 30 mg tablet Not Taking Self Yes No   Sig: Take 30 mg by mouth daily at bedtime   Patient not taking: Reported on 6/28/2025   benztropine (COGENTIN) 2 mg tablet Not Taking Self Yes No   Sig: Take 2 mg by mouth every morning   Patient not taking: Reported on 6/28/2025   ergocalciferol (VITAMIN D2) 50,000 units 6/28/2025 Morning Self Yes Yes   levothyroxine 112 mcg tablet 6/28/2025 Morning  No Yes   Sig: Take 1 tablet by mouth daily in the early morning for 30 days   naproxen (Naprosyn) 500 mg tablet Not Taking  No No   Sig: Take 1 tablet (500 mg total) by mouth 2 (two) times a day with meals   Patient not taking: Reported on 6/28/2025      Facility-Administered Medications: None     Patient's Medications   Discharge Prescriptions    No medications on file     No discharge procedures on file.  ED SEPSIS DOCUMENTATION   Time reflects when diagnosis was documented in both MDM as applicable and the Disposition within this note       Time User Action Codes Description Comment    6/28/2025  4:20 PM Jose Siddiqui Add [F22] Paranoia (HCC)                      [1]   Past Medical History:  Diagnosis Date    Allergic rhinitis     Disease of thyroid gland     Schizoaffective disorder (HCC)    [2]   Past Surgical History:  Procedure Laterality Date    ADENOIDECTOMY      TONSILLECTOMY     [3]   Family History  Problem Relation Name Age of Onset    Thyroid disease Mother Gi Plasencia     Sleep apnea Father     [4]   Social History  Tobacco Use    Smoking status: Never    Smokeless tobacco: Never   Vaping Use    Vaping status: Never Used   Substance Use Topics    Alcohol use: Yes     Comment: Once a year on Moriah    Drug use: No        Jose Siddiqui PA-C  06/28/25 2407

## 2025-06-28 NOTE — ED NOTES
Pt on a virtual teams meeting with UNA Miranda at this time.      Fredy Suarez RN  06/28/25 1955

## 2025-06-28 NOTE — ED NOTES
Patient ate cup of applesauce and is sitting on stretcher talking to herself. No acute distress noted. Q15s maintained.     Sandra Elaine RN  06/28/25 8353

## 2025-06-28 NOTE — ED NOTES
Pt ambulated to restroom without difficulty. Patient offered to order dinner tray but stated she was not hungry at this time. Patient pleasant and cooperative.      Sandra Elaine RN  06/28/25 1800       Sandra Elaine RN  06/28/25 1800

## 2025-06-29 VITALS
RESPIRATION RATE: 18 BRPM | DIASTOLIC BLOOD PRESSURE: 64 MMHG | SYSTOLIC BLOOD PRESSURE: 113 MMHG | WEIGHT: 145.5 LBS | HEART RATE: 66 BPM | TEMPERATURE: 98.2 F | OXYGEN SATURATION: 100 % | BODY MASS INDEX: 24.98 KG/M2

## 2025-06-29 NOTE — ED NOTES
Pt's father recalled and asked to talk to the pt. JUSTYN Hernandez informed him that she did not want to speak to him at this time. Father asked if she would like to talk to her sister or mother. Pt declined to talk to both.      Fredy Suarez RN  06/28/25 0920

## 2025-06-29 NOTE — ED NOTES
"Pt offered by this RN to order a breakfast tray at this time. Pt declined and stated \"No not right now, I'm not hungry.\"      Fredy Suarez RN  06/29/25 0622    "

## 2025-06-29 NOTE — ED NOTES
Assumed care of pt at this time. Pt is resting comfortably in room. Pt respirations relaxed and non labored. Pt is on a Q 15 minute safety checks which are being documented on paper ED behavior health observation record.     Ariel Joseph RN  06/29/25 0733

## 2025-06-29 NOTE — ED NOTES
Pt ambulated to the bathroom at this time. Pt ambulated with a steady gait, no outward signs of distress noted by this RN.      Fredy Suarez RN  06/28/25 0726

## 2025-06-29 NOTE — EMTALA/ACUTE CARE TRANSFER
[facility] EMERGENCY DEPARTMENT  [address] [address]  Dept: [phone]      EMTALA TRANSFER CONSENT    NAME Ngoc VASQUEZ 1990                              MRN [id number]    I have been informed of my rights regarding examination, treatment, and transfer   by Jose Siddiqui PA-C    Benefits: Continuity of care    Risks: Potential for delay in receiving treatment      Consent for Transfer:  I acknowledge that my medical condition has been evaluated and explained to me by the emergency department physician or other qualified medical person and/or my attending physician, who has recommended that I be transferred to the service of  Accepting Physician: Amanda Mosley MD at Accepting Facility Name, City & State : [facility], [address] [place] [address], [facility] [address]. The above potential benefits of such transfer, the potential risks associated with such transfer, and the probable risks of not being transferred have been explained to me, and I fully understand them.  The doctor has explained that, in my case, the benefits of transfer outweigh the risks.  I agree to be transferred.    I authorize the performance of emergency medical procedures and treatments upon me in both transit and upon arrival at the receiving facility.  Additionally, I authorize the release of any and all medical records to the receiving facility and request they be transported with me, if possible.  I understand that the safest mode of transportation during a medical emergency is an ambulance and that the Hospital advocates the use of this mode of transport. Risks of traveling to the receiving facility by car, including absence of medical control, life sustaining equipment, such as oxygen, and medical personnel has been explained to me and I fully understand them.    (HUY CORRECT BOX BELOW)  [  ]  I consent to the stated transfer and to be transported by  ambulance/helicopter.  [  ]  I consent to the stated transfer, but refuse transportation by ambulance and accept full responsibility for my transportation by car.  I understand the risks of non-ambulance transfers and I exonerate the Hospital and its staff from any deterioration in my condition that results from this refusal.    X___________________________________________    DATE  25  TIME________  Signature of patient or legally responsible individual signing on patient behalf           RELATIONSHIP TO PATIENT_________________________          Provider Certification    NAME Ngoc VASQUEZ 1990                              MRN [id number]    A medical screening exam was performed on the above named patient.  Based on the examination:    Condition Necessitating Transfer The primary encounter diagnosis was Paranoia (HCC). A diagnosis of Schizophrenia (HCC) was also pertinent to this visit.    Patient Condition: The patient has been stabilized such that within reasonable medical probability, no material deterioration of the patient condition or the condition of the unborn child(omar) is likely to result from the transfer    Reason for Transfer: No bed available at level of patient's needs, Level of Care needed not available at this facility    Transfer Requirements: Facility [facility], [address], [facility] [address]   Space available and qualified personnel available for treatment as acknowledged by Molly Casillas/ [phone]  Agreed to accept transfer and to provide appropriate medical treatment as acknowledged by       Amanda Mosley MD  Appropriate medical records of the examination and treatment of the patient are provided at the time of transfer   STAFF INITIAL WHEN COMPLETED _______  Transfer will be performed by qualified personnel from [facility]/ [phone]  and appropriate transfer equipment as required, including the use of necessary and appropriate  life support measures.    Provider Certification: I have examined the patient and explained the following risks and benefits of being transferred/refusing transfer to the patient/family:  General risk, such as traffic hazards, adverse weather conditions, rough terrain or turbulence, possible failure of equipment (including vehicle or aircraft), or consequences of actions of persons outside the control of the transport personnel      Based on these reasonable risks and benefits to the patient and/or the unborn child(omar), and based upon the information available at the time of the patient’s examination, I certify that the medical benefits reasonably to be expected from the provision of appropriate medical treatments at another medical facility outweigh the increasing risks, if any, to the individual’s medical condition, and in the case of labor to the unborn child, from effecting the transfer.    X____________________________________________ DATE 06/29/25        TIME_______      ORIGINAL - SEND TO MEDICAL RECORDS   COPY - SEND WITH PATIENT DURING TRANSFER

## 2025-06-29 NOTE — ED NOTES
Bed search results:    [facility]-At capacity per Law from Intake.  [place]: Antonia torres review.  [facility]: Full per Alyssa.  [facility]: Full per Bernadette.  [facility]: At capacity per Coco.  [facility]: At capacity per Ne.  [facility]: Full per Sherri.
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
## 2025-06-29 NOTE — ED NOTES
Patient is accepted at [facility].  Patient is accepted by Amanda Mosley MD     Transportation is arranged with: CTS  Transportation is scheduled for 1230.   Patient may go to the floor upon arrival.      Nurse report is not required.

## 2025-06-29 NOTE — ED NOTES
Patient has been medically cleared. Awaiting bed status from Intake; however, beds within the network have been at capacity. A bed search is in progress in the interim.

## 2025-06-29 NOTE — ED CARE HANDOFF
Emergency Department Sign Out Note        Sign out and transfer of care from Iveth Miranda PA-C. See Separate Emergency Department note.     The patient, Ngoc Roe, was evaluated by the previous provider for paranoia.    Workup Completed:  Results Reviewed       Procedure Component Value Units Date/Time    Rapid drug screen, urine [[id number]]  (Normal) Collected: 06/28/25 1718    Lab Status: Final result Specimen: Urine, Other Updated: 06/28/25 1739     Amph/Meth UR Negative     Barbiturate Ur Negative     Benzodiazepine Urine Negative     Cocaine Urine Negative     Methadone Urine Negative     Opiate Urine Negative     PCP Ur Negative     THC Urine Negative     Oxycodone Urine Negative     Fentanyl Urine Negative     HYDROCODONE URINE Negative    Narrative:      FOR MEDICAL PURPOSES ONLY.   IF CONFIRMATION NEEDED PLEASE CONTACT THE LAB WITHIN 5 DAYS.    Drug Screen Cutoff Levels:  AMPHETAMINE/METHAMPHETAMINES  1000 ng/mL  BARBITURATES     200 ng/mL  BENZODIAZEPINES     200 ng/mL  COCAINE      300 ng/mL  METHADONE      300 ng/mL  OPIATES      300 ng/mL  PHENCYCLIDINE     25 ng/mL  THC       50 ng/mL  OXYCODONE      100 ng/mL  FENTANYL      5 ng/mL  HYDROCODONE     300 ng/mL    POCT pregnancy, urine [[id number]]  (Normal) Collected: 06/28/25 1723    Lab Status: Final result Specimen: Urine Updated: 06/28/25 1724     EXT Preg Test, Ur Negative     Control Valid    Basic metabolic panel [[id number]]  (Abnormal) Collected: 06/28/25 1600    Lab Status: Final result Specimen: Blood from Arm, Right Updated: 06/28/25 1619     Sodium 128 mmol/L      Potassium 3.0 mmol/L      Chloride 96 mmol/L      CO2 20 mmol/L      ANION GAP 12 mmol/L      BUN 10 mg/dL      Creatinine 0.73 mg/dL      Glucose 159 mg/dL      Calcium 8.8 mg/dL      eGFR 107 ml/min/1.73sq m     Narrative:      National Kidney Disease Foundation guidelines for Chronic Kidney Disease (CKD):     Stage 1 with normal or high GFR (GFR > 90 mL/min/1.73 square  meters)    Stage 2 Mild CKD (GFR = 60-89 mL/min/1.73 square meters)    Stage 3A Moderate CKD (GFR = 45-59 mL/min/1.73 square meters)    Stage 3B Moderate CKD (GFR = 30-44 mL/min/1.73 square meters)    Stage 4 Severe CKD (GFR = 15-29 mL/min/1.73 square meters)    Stage 5 End Stage CKD (GFR <15 mL/min/1.73 square meters)  Note: GFR calculation is accurate only with a steady state creatinine    CBC and differential [[id number]]  (Abnormal) Collected: 06/28/25 1600    Lab Status: Final result Specimen: Blood from Arm, Right Updated: 06/28/25 1605     WBC 7.93 Thousand/uL      RBC 4.43 Million/uL      Hemoglobin 12.5 g/dL      Hematocrit 36.9 %      MCV 83 fL      MCH 28.2 pg      MCHC 33.9 g/dL      RDW 11.3 %      MPV 10.2 fL      Platelets 276 Thousands/uL      nRBC 0 /100 WBCs      Segmented % 72 %      Immature Grans % 0 %      Lymphocytes % 22 %      Monocytes % 5 %      Eosinophils Relative 0 %      Basophils Relative 1 %      Absolute Neutrophils 5.71 Thousands/µL      Absolute Immature Grans 0.02 Thousand/uL      Absolute Lymphocytes 1.75 Thousands/µL      Absolute Monocytes 0.40 Thousand/µL      Eosinophils Absolute 0.00 Thousand/µL      Basophils Absolute 0.05 Thousands/µL     POCT alcohol breath test [[id number]]  (Normal) Collected: 06/28/25 1554    Lab Status: Final result Updated: 06/28/25 1554     EXTBreath Alcohol 0.00              ED Course / Workup Pending (followup):          No data recorded                          ED Course as of 06/29/25 1309   Sat Jun 28, 2025   1555 Per crisis worker Mel cardoso in network beds available so will likely need blood work to go outpatient. Will order CBC and BMP. Pt has a history of schizophrenia and paranoia   1619 Sodium(!): 128  Will give 1L NS   1619 Potassium(!): 3.0  Will give oral K   1659 S/O to EM GLENDY: paranoia, agreeable to sign 201 but labs obtained due to potential of going out of network. Showed hypoK and hypoNa. Once fluids are done and given potassium  then medically cleared to be seen by crisis.    [place] Jun 29, 2025   1142 Accepted to [place]. CTS  at 1230     Procedures  Medical Decision Making  Pt transferred to [place] for further care.     Amount and/or Complexity of Data Reviewed  Labs: ordered. Decision-making details documented in ED Course.    Risk  Prescription drug management.  Decision regarding hospitalization.            Disposition  Final diagnoses:   Paranoia (HCC)   Schizophrenia (HCC)     Time reflects when diagnosis was documented in both MDM as applicable and the Disposition within this note       Time User Action Codes Description Comment    6/28/2025  4:20 PM Jose Siddiqui Add [F22] Paranoia (HCC)     6/28/2025  8:59 PM Clara Coronel Add [F20.9] Schizophrenia (HCC)           ED Disposition       ED Disposition   Transfer to Behavioral Health Condition   --    Date/Time   Sun Jun 29, 2025 11:43 AM    Comment   Ngoc Roe should be transferred out to [place] and has been medically cleared.               MD Documentation      Flowsheet Row Most Recent Value   Patient Condition The patient has been stabilized such that within reasonable medical probability, no material deterioration of the patient condition or the condition of the unborn child(omar) is likely to result from the transfer   Reason for Transfer No bed available at level of patient's needs, Level of Care needed not available at this facility   Benefits of Transfer Continuity of care   Risks of Transfer Potential for delay in receiving treatment   Accepting Physician Amanda Mosley MD   Accepting Facility Name, [facility], [address] , [facility] [address]    (Name & Tel number) Molly Casillas/ [phone]   Transported by (Company and Unit #) [facility]/ [phone]   Sending MD Jose Siddiqui PA-C, Hayden Burns MD   Provider Certification General risk, such as traffic hazards, adverse weather conditions, rough terrain or turbulence,  possible failure of equipment (including vehicle or aircraft), or consequences of actions of persons outside the control of the transport personnel          RN Documentation      Flowsheet Row Most Recent Value   Accepting Facility Name, City & State  [facility], [address], [facility] [address]    (Name & Tel number) Molly Casillas/ [phone]   Transport Mode Other (Comment)   Transported by (Company and Unit #) [facility]/ [phone]   Level of Care Other (Comment)   Copies of Medical Records Sent History and Physical, Progress note, Transfer form, Nursing note, Labs   Patient Belongings Disposition Sent with patient   Transfer Date 06/29/25   Transfer Time 1230          Follow-up Information    None       Discharge Medication List as of 6/29/2025 12:30 PM        CONTINUE these medications which have NOT CHANGED    Details   ergocalciferol (VITAMIN D2) 50,000 units Starting Tue 8/8/2023, Historical Med      levothyroxine 112 mcg tablet Take 1 tablet by mouth daily in the early morning for 30 days, Starting Tue 11/7/2017, Until Sat 6/28/2025, Print      ARIPiprazole (ABILIFY) 30 mg tablet Take 30 mg by mouth daily at bedtime, Starting Wed 9/6/2023, Historical Med      benztropine (COGENTIN) 2 mg tablet Take 2 mg by mouth every morning, Starting Wed 9/6/2023, Historical Med      naproxen (Naprosyn) 500 mg tablet Take 1 tablet (500 mg total) by mouth 2 (two) times a day with meals, Starting Mon 10/14/2024, Normal           No discharge procedures on file.       ED Provider  Electronically Signed by     Jose Siddiqui PA-C  06/29/25 [id number]

## 2025-06-29 NOTE — ED NOTES
After speaking with treatment team, 201 would be the best course of action. 201 sent for signatures

## 2025-06-29 NOTE — ED NOTES
Patient has a history of schizoaffective disorder. She has not been compliant with medication for  an unknown period of time.

## 2025-06-29 NOTE — ED NOTES
Insurance Authorization for admission:   Phone call placed to [facility]  Phone number: [phone]  Spoke to Lela NICOLE  5 days approved.  Level of care: Inpatient Psych  Review on 7/3/25     Reference #: [id number]    Please have accepting facility call once she arrives on unit to obtain authorization #.

## 2025-06-29 NOTE — ED NOTES
Pt's father called at this time asking to talk to pt. Pt denied and stated she did not want to talk to him.      Fredy Suarez RN  06/28/25 2052     Fredy Suarez RN  06/28/25 2053